# Patient Record
Sex: FEMALE | Race: WHITE | Employment: FULL TIME | URBAN - METROPOLITAN AREA
[De-identification: names, ages, dates, MRNs, and addresses within clinical notes are randomized per-mention and may not be internally consistent; named-entity substitution may affect disease eponyms.]

---

## 2017-03-06 ENCOUNTER — OFFICE VISIT (OUTPATIENT)
Dept: FAMILY MEDICINE | Facility: CLINIC | Age: 32
End: 2017-03-06

## 2017-03-06 VITALS
OXYGEN SATURATION: 99 % | BODY MASS INDEX: 31.84 KG/M2 | HEART RATE: 82 BPM | HEIGHT: 70 IN | WEIGHT: 222.4 LBS | TEMPERATURE: 98.8 F | DIASTOLIC BLOOD PRESSURE: 70 MMHG | SYSTOLIC BLOOD PRESSURE: 110 MMHG

## 2017-03-06 DIAGNOSIS — Z30.015 ENCOUNTER FOR INITIAL PRESCRIPTION OF VAGINAL RING HORMONAL CONTRACEPTIVE: ICD-10-CM

## 2017-03-06 DIAGNOSIS — Z01.419 ENCOUNTER FOR GYNECOLOGICAL EXAMINATION WITHOUT ABNORMAL FINDING: Primary | ICD-10-CM

## 2017-03-06 DIAGNOSIS — Z13.9 SCREENING FOR CONDITION: ICD-10-CM

## 2017-03-06 DIAGNOSIS — K64.4 ANAL SKIN TAG: ICD-10-CM

## 2017-03-06 PROCEDURE — 99395 PREV VISIT EST AGE 18-39: CPT | Performed by: FAMILY MEDICINE

## 2017-03-06 RX ORDER — LEVONORGESTREL/ETHIN.ESTRADIOL 0.1-0.02MG
1 TABLET ORAL DAILY
Qty: 84 TABLET | Refills: 3 | Status: CANCELLED | OUTPATIENT
Start: 2017-03-06

## 2017-03-06 RX ORDER — ETONOGESTREL AND ETHINYL ESTRADIOL VAGINAL RING .015; .12 MG/D; MG/D
RING VAGINAL
Qty: 3 EACH | Refills: 3 | Status: SHIPPED | OUTPATIENT
Start: 2017-03-06 | End: 2018-04-04

## 2017-03-06 NOTE — NURSING NOTE
Mireya is here for a non-fasting physical and pap.     Pre-Visit Screening :  Immunizations : up to date  Colonoscopy : na  Mammogram : na  Asthma Action Test/Plan : na  PHQ9/GAD7 :  Na    Pulse - regular  My Chart - accepts    CLASSIFICATION OF OVERWEIGHT AND OBESITY BY BMI                         Obesity Class           BMI(kg/m2)  Underweight                                    < 18.5  Normal                                         18.5-24.9  Overweight                                     25.0-29.9  OBESITY                     I                  30.0-34.9                              II                 35.0-39.9  EXTREME OBESITY             III                >40                             Patient's  BMI Body mass index is 32.37 kg/(m^2).  http://hin.nhlbi.nih.gov/menuplanner/menu.cgi  Questioned patient about current smoking habits.  Pt. has never smoked.  Tanya.JENNIFER العلي ([facility])

## 2017-03-06 NOTE — PROGRESS NOTES
31 year old female presents for her annual exam   Current contraception: oral contraceptives / no apparent side effects/ normal monthly withdrawal periods  History of abnormal Pap smear: yes - LSIL 2010/ colposcopy  HPV pos 2012  Normal pap smear 1/2015- defers until next year  Regular self breast exam: No   History of abnormal mammogram: no   Family history of breast, uterine or ovarian cancer: no   History of abnormal lipids: normal lipid 2016  In a monogamous relationship  defers STD screening    Patient Active Problem List   Diagnosis     Other abnormal Papanicolaou smear of cervix and cervical HPV(795.09)     ACP (advance care planning)     Health Care Home     Advanced directives, counseling/discussion     Past Surgical History   Procedure Laterality Date     No history of surgery       Family History   Problem Relation Age of Onset     Hypertension Maternal Grandmother      Breast Cancer No family hx of      Cancer - colorectal No family hx of      CEREBROVASCULAR DISEASE Paternal Grandfather      in 50's   \  Current Outpatient Prescriptions   Medication     levonorgestrel-ethinyl estradiol (AVIANE) 0.1-20 MG-MCG per tablet     Cholecalciferol (VITAMIN D3 PO)     No current facility-administered medications for this visit.      Review Of Systems  Skin: negative  Eyes: negative  Ears/Nose/Throat: negative  Respiratory: No shortness of breath, dyspnea on exertion, cough, or hemoptysis  Cardiovascular: negative  Gastrointestinal: has an anal tag that she wishes removed  Genitourinary: negative  Musculoskeletal: negative  Neurologic: negative  Psychiatric: negative  Hematologic/she would like to start beekeeping and wants to know if she is allergic to bees: referred to allergist for consultation   negative  Endocrine: obesity: weight unchanged- inactive/ planning on starting an exercise program  Gardens- eats fruits and vegetables    OBJECTIVE:  /70 (BP Location: Right arm, Patient Position: Chair, Cuff  "Size: Adult Large)  Pulse 82  Temp 98.8  F (37.1  C) (Oral)  Ht 1.765 m (5' 9.5\")  Wt 100.9 kg (222 lb 6.4 oz)  LMP 02/13/2017  SpO2 99%  BMI 32.37 kg/m2  General appearance: Healthy    Skin: Normal. No atypical appearing moles on inspection of trunk and extremities.    External ears  and canals clear bilaterally. TM's normal bilaterally. Nose normal without lesions or discharge. Oropharynx normal. Neck supple without palpable adenopathy.    Breasts are symmetric.  No dominant, discrete, fixed  or suspicious masses are noted.  No skin or nipple changes or axillary nodes.      Regular rate and  rhythm. S1 and S2 normal, no murmurs, clicks, gallops or rubs. No edema or JVD. Chest is clear; no wheezes or rales.    The abdomen is soft without tenderness, guarding, mass or organomegaly. Bowel sounds are normal. No CVA tenderness or inguinal adenopathy noted.    Pelvic:  Vagina and vulva are normal.   No palpable uterine or adnexal masses or tenderness.  Pap obtained and pending.    Rectal exam: large anal tag that she wishes removed- sometimes gets irritated    Extremities: negative.    Assessment    (Z01.419) Encounter for gynecological examination without abnormal finding  (primary encounter diagnosis)  Comment:  Plan:     (Z30.015) Encounter for initial prescription of vaginal ring hormonal contraceptive  Comment:five ring samples given  Plan: etonogestrel-ethinyl estradiol (NUVARING)         0.12-0.015 MG/24HR vaginal ring            (Z13.9) Screening for condition  Comment: bee allergy  Plan: ALLERGY/ASTHMA ADULT REFERRAL            (K64.4) Anal skin tag  Comment:   Plan: COLORECTAL SURGERY REFERRAL                   "

## 2017-03-06 NOTE — MR AVS SNAPSHOT
After Visit Summary   3/6/2017    Mireya Ardon    MRN: [id number]           Patient Information     Date Of Birth          1985        Visit Information        Provider Department      3/6/2017 3:30 PM Matilde Gregory MD [facility]        Today's Diagnoses     Encounter for gynecological examination without abnormal finding    -  1    Encounter for initial prescription of vaginal ring hormonal contraceptive          Care Instructions      When switching from a combination contraceptive, the vaginal ring may be inserted at any time but no later than the day that a new cycle of pills or patch would have been started; no alternative contraception is needed       Inserted 1 ring into vagina by patient and wear continuously for 3 weeks; remove for 1 week; repeat with new ring (regardless if withdrawal bleeding has or has not finished          Follow-ups after your visit        Who to contact     If you have questions or need follow up information about today's clinic visit or your schedule please contact [facility] directly at [phone].  Normal or non-critical lab and imaging results will be communicated to you by Kinoptohart, letter or phone within 4 business days after the clinic has received the results. If you do not hear from us within 7 days, please contact the clinic through SMITH (formerly Ascentium)t or phone. If you have a critical or abnormal lab result, we will notify you by phone as soon as possible.  Submit refill requests through Cyanto or call your pharmacy and they will forward the refill request to us. Please allow 3 business days for your refill to be completed.          Additional Information About Your Visit        MyChart Information     Cyanto gives you secure access to your electronic health record. If you see a primary care provider, you can also send messages to your care team and make appointments. If you have questions, please call your  "primary care clinic.  If you do not have a primary care provider, please call [phone] and they will assist you.        Care EveryWhere ID     This is your Care EveryWhere ID. This could be used by other organizations to access your [facility] medical records  [id number]        Your Vitals Were     Pulse Temperature Height Last Period Pulse Oximetry BMI (Body Mass Index)    82 98.8  F (37.1  C) (Oral) 1.765 m (5' 9.5\") 02/13/2017 99% 32.37 kg/m2       Blood Pressure from Last 3 Encounters:   03/06/17 110/70   02/15/16 110/72   01/07/15 110/70    Weight from Last 3 Encounters:   03/06/17 100.9 kg (222 lb 6.4 oz)   02/15/16 101.2 kg (223 lb)   01/07/15 100.5 kg (221 lb 9.6 oz)              Today, you had the following     No orders found for display         Today's Medication Changes          These changes are accurate as of: 3/6/17  4:19 PM.  If you have any questions, ask your nurse or doctor.               Start taking these medicines.        Dose/Directions    etonogestrel-ethinyl estradiol 0.12-0.015 MG/24HR vaginal ring   Commonly known as:  NUVARING   Used for:  Encounter for initial prescription of vaginal ring hormonal contraceptive   Started by:  Matilde Gregory MD        Insert 1 ring vaginally every 21 days then remove for 1 week then repeat with new ring.   Quantity:  3 each   Refills:  3            Where to get your medicines      These medications were sent to [facility]/pharmacy #[id number] - [place], [place] - [address]  [address] [facility] [address]     Phone:  [phone]     etonogestrel-ethinyl estradiol 0.12-0.015 MG/24HR vaginal ring                Primary Care Provider Office Phone # Fax #    Matilde Gregory [phone]       [facility] [address] [facility] [address]  [facility] [address]        Thank you!     Thank you for choosing [facility]  for your care. Our goal is always to provide you with excellent care. Hearing back " from our patients is one way we can continue to improve our services. Please take a few minutes to complete the written survey that you may receive in the mail after your visit with us. Thank you!             Your Updated Medication List - Protect others around you: Learn how to safely use, store and throw away your medicines at www.disposemymeds.org.          This list is accurate as of: 3/6/17  4:19 PM.  Always use your most recent med list.                   Brand Name Dispense Instructions for use    etonogestrel-ethinyl estradiol 0.12-0.015 MG/24HR vaginal ring    NUVARING    3 each    Insert 1 ring vaginally every 21 days then remove for 1 week then repeat with new ring.       levonorgestrel-ethinyl estradiol 0.1-20 MG-MCG per tablet    AVIANE    84 tablet    Take 1 tablet by mouth daily       VITAMIN D3 PO      Take 5,000 Units by mouth daily

## 2017-03-06 NOTE — PATIENT INSTRUCTIONS
When switching from a combination contraceptive, the vaginal ring may be inserted at any time but no later than the day that a new cycle of pills or patch would have been started; no alternative contraception is needed       Inserted 1 ring into vagina by patient and wear continuously for 3 weeks; remove for 1 week; repeat with new ring (regardless if withdrawal bleeding has or has not finished

## 2017-04-13 ENCOUNTER — TRANSFERRED RECORDS (OUTPATIENT)
Dept: FAMILY MEDICINE | Facility: CLINIC | Age: 32
End: 2017-04-13

## 2017-04-21 ENCOUNTER — MYC MEDICAL ADVICE (OUTPATIENT)
Dept: FAMILY MEDICINE | Facility: CLINIC | Age: 32
End: 2017-04-21

## 2017-04-21 NOTE — TELEPHONE ENCOUNTER
Mireya Ardon called the clinic support line with the following:    States that she went to the Allergist and they scheduled her on the wrong day for venom testing. They also told her since she has never been stung, that they cannot test her. If she were to get stung and react to call 911. She is going to call another Allergist to see if they say the same thing to her.    She can call us back if she needs a referral for this.   Can close once noted

## 2017-04-24 NOTE — TELEPHONE ENCOUNTER
[employer] also states that bee venom testing is NOT indicated without a history of an allergic reaction to bee sting.    Incidence of systemic bee sting reactions is 3%    No testing available to date to determine ahead of time if you could be allergic    Please call

## 2017-05-08 ENCOUNTER — OFFICE VISIT (OUTPATIENT)
Dept: FAMILY MEDICINE | Facility: CLINIC | Age: 32
End: 2017-05-08

## 2017-05-08 VITALS
BODY MASS INDEX: 33.3 KG/M2 | WEIGHT: 224.8 LBS | HEIGHT: 69 IN | SYSTOLIC BLOOD PRESSURE: 104 MMHG | DIASTOLIC BLOOD PRESSURE: 70 MMHG | TEMPERATURE: 98.7 F | OXYGEN SATURATION: 97 % | HEART RATE: 83 BPM

## 2017-05-08 DIAGNOSIS — D69.6 TEMPORARY LOW PLATELET COUNT (H): ICD-10-CM

## 2017-05-08 DIAGNOSIS — Z13.9 SCREENING FOR CONDITION: ICD-10-CM

## 2017-05-08 DIAGNOSIS — Z01.818 PREOP GENERAL PHYSICAL EXAM: Primary | ICD-10-CM

## 2017-05-08 DIAGNOSIS — K64.4 SKIN TAG OF ANUS: ICD-10-CM

## 2017-05-08 LAB
ERYTHROCYTE [DISTWIDTH] IN BLOOD BY AUTOMATED COUNT: 12.5 %
HCT VFR BLD AUTO: 42.8 % (ref 35–47)
HEMOGLOBIN: 13.8 G/DL (ref 11.7–15.7)
MCH RBC QN AUTO: 29.3 PG (ref 26–33)
MCHC RBC AUTO-ENTMCNC: 32.2 G/DL (ref 31–36)
MCV RBC AUTO: 90.8 FL (ref 78–100)
PLATELET COUNT - QUEST: 109 10^9/L (ref 150–375)
RBC # BLD AUTO: 4.71 10*12/L (ref 3.8–5.2)
WBC # BLD AUTO: 10.2 10*9/L (ref 4–11)

## 2017-05-08 PROCEDURE — 85027 COMPLETE CBC AUTOMATED: CPT | Performed by: FAMILY MEDICINE

## 2017-05-08 PROCEDURE — 36415 COLL VENOUS BLD VENIPUNCTURE: CPT | Performed by: FAMILY MEDICINE

## 2017-05-08 PROCEDURE — 99214 OFFICE O/P EST MOD 30 MIN: CPT | Performed by: FAMILY MEDICINE

## 2017-05-08 NOTE — PATIENT INSTRUCTIONS
Recheck blood count in three months  This can be a lab only appoiintment  Schedule fasting (nothing to eat or drink for 9 hours except water)

## 2017-05-08 NOTE — MR AVS SNAPSHOT
After Visit Summary   5/8/2017    Mireya Ardon    MRN: [id number]           Patient Information     Date Of Birth          1985        Visit Information        Provider Department      5/8/2017 4:30 PM Matilde Gregory MD [facility]        Today's Diagnoses     Preop general physical exam    -  1    Skin tag of anus        Temporary low platelet count (H)        Screening for condition          Care Instructions    Recheck blood count in three months  This can be a lab only appoiintment  Schedule fasting (nothing to eat or drink for 9 hours except water)        Follow-ups after your visit        Future tests that were ordered for you today     Open Standing Orders        Priority Remaining Interval Expires Ordered    Lipid Profile Routine 1/1 9/8/2017 5/8/2017    Glucose Fasting (BFP) Routine 1/1 9/8/2017 5/8/2017    VENOUS COLLECTION Routine 1/1 9/8/2017 5/8/2017            Who to contact     If you have questions or need follow up information about today's clinic visit or your schedule please contact [facility] directly at [phone].  Normal or non-critical lab and imaging results will be communicated to you by gloStreamhart, letter or phone within 4 business days after the clinic has received the results. If you do not hear from us within 7 days, please contact the clinic through Dattot or phone. If you have a critical or abnormal lab result, we will notify you by phone as soon as possible.  Submit refill requests through Xola or call your pharmacy and they will forward the refill request to us. Please allow 3 business days for your refill to be completed.          Additional Information About Your Visit        gloStreamhart Information     Xola gives you secure access to your electronic health record. If you see a primary care provider, you can also send messages to your care team and make appointments. If you have questions, please call  "your primary care clinic.  If you do not have a primary care provider, please call [phone] and they will assist you.        Care EveryWhere ID     This is your Care EveryWhere ID. This could be used by other organizations to access your [facility] medical records  [id number]        Your Vitals Were     Pulse Temperature Height Last Period Pulse Oximetry Breastfeeding?    83 98.7  F (37.1  C) (Oral) 1.753 m (5' 9\") 04/11/2017 97% No    BMI (Body Mass Index)                   33.2 kg/m2            Blood Pressure from Last 3 Encounters:   05/08/17 104/70   03/06/17 110/70   02/15/16 110/72    Weight from Last 3 Encounters:   05/08/17 102 kg (224 lb 12.8 oz)   03/06/17 100.9 kg (222 lb 6.4 oz)   02/15/16 101.2 kg (223 lb)              We Performed the Following     HEMOGRAM/PLATELET (BFP)     VENOUS COLLECTION        Primary Care Provider Office Phone # Fax #    Matilde Gregory [phone]       [facility] [address]   [facility] [address]        Thank you!     Thank you for choosing [facility]  for your care. Our goal is always to provide you with excellent care. Hearing back from our patients is one way we can continue to improve our services. Please take a few minutes to complete the written survey that you may receive in the mail after your visit with us. Thank you!             Your Updated Medication List - Protect others around you: Learn how to safely use, store and throw away your medicines at www.disposemymeds.org.          This list is accurate as of: 5/8/17 11:59 PM.  Always use your most recent med list.                   Brand Name Dispense Instructions for use    etonogestrel-ethinyl estradiol 0.12-0.015 MG/24HR vaginal ring    NUVARING    3 each    Insert 1 ring vaginally every 21 days then remove for 1 week then repeat with new ring.       VITAMIN D3 PO      Take 5,000 Units by mouth daily         "

## 2017-05-08 NOTE — PROGRESS NOTES
[facility]  [address]  [address]  [facility] [address] [phone]  Dept: [phone]    PRE-OP EVALUATION:  Today's date: 2017    Mireya Ardon (: 1985) presents for pre-operative evaluation assessment as requested by Dr. Friedman.  She requires evaluation and anesthesia risk assessment prior to undergoing surgery/procedure for treatment of EUA, Excision of Skin Tag .  Proposed procedure:  Anal Skin Tag    Date of Surgery/ Procedure: 17  Time of Surgery/ Procedure: [facility]/Surgical Facility: [facility]  Fax number for surgical facility: [phone]  Primary Physician: Matilde Gregory  Type of Anesthesia Anticipated: to be determined    Patient has a Health Care Directive or Living Will:  NO    1. NO - Do you have a history of heart attack, stroke, stent, bypass or surgery on an artery in the head, neck, heart or legs?  2. NO - Do you ever have any pain or discomfort in your chest?  3. NO - Do you have a history of  Heart Failure?  4. NO - Are you troubled by shortness of breath when: walking on the level, up a slight hill or at night?  5. NO - Do you currently have a cold, bronchitis or other respiratory infection?  6. NO - Do you have a cough, shortness of breath or wheezing?  7. NO - Do you sometimes get pains in the calves of your legs when you walk?  8. NO - Do you or anyone in your family have previous history of blood clots?  9. NO - Do you or does anyone in your family have a serious bleeding problem such as prolonged bleeding following surgeries or cuts?  10. NO - Have you ever had problems with anemia or been told to take iron pills?  11. NO - Have you had any abnormal blood loss such as black, tarry or bloody stools, or abnormal vaginal bleeding?  12. NO - Have you ever had a blood transfusion?  13. NO - Have you or any of your relatives ever had problems with anesthesia?  14. NO- Do you have sleep apnea, excessive  snoring or daytime drowsiness?  Patient admits to mild snoring  15. NO - Do you have any prosthetic heart valves?  16. NO - Do you have prosthetic joints?  17. NO - Is there any chance that you may be pregnant?      HPI:                                                      Brief HPI related to upcoming procedure: bothersome anal tag    MEDICAL HISTORY:                                                      Patient Active Problem List    Diagnosis Date Noted     Advanced directives, counseling/discussion 01/07/2015     Priority: Medium     Advance Care Planning:   ACP Review and Resources Provided:  Reviewed chart for advance care plan.  Mireya Ardon has no plan or code status on file. Discussed available resources and provided with information. Confirmed code status reflects current choices pending further ACP discussions.  Confirmed/documented designated decision maker(s). See permanent comments section of demographics in clinical tab.   Added by Magdalena Loyd on 1/7/2015             Health [facility] [place] 09/23/2013     Priority: Medium     State Tier Level:  Tier 0  Status:  n/a  Care Coordinator:   See Letters for [facility] Care Plan           ACP (advance care planning) 05/01/2012     Priority: Medium       Advance Care Planning 2/15/2016: ACP Review of Chart / Resources Provided:  Reviewed chart for advance care plan.  Mireya Ardon has no plan or code status on file. Discussed available resources and provided with information. Confirmed code status reflects current choices pending further ACP discussions.  Confirmed/documented legally designated decision makers.  Added by Johanna Hilario             Other abnormal Papanicolaou smear of cervix and cervical HPV(795.09) 03/22/2011     Priority: Medium      No past medical history on file.  Past Surgical History:   Procedure Laterality Date     NO HISTORY OF SURGERY       Current Outpatient Prescriptions   Medication Sig Dispense Refill     etonogestrel-ethinyl  "estradiol (NUVARING) 0.12-0.015 MG/24HR vaginal ring Insert 1 ring vaginally every 21 days then remove for 1 week then repeat with new ring. 3 each 3     Cholecalciferol (VITAMIN D3 PO) Take 5,000 Units by mouth daily       OTC products: None, except as noted above    No Known Allergies   Latex Allergy: NO    Social History   Substance Use Topics     Smoking status: Never Smoker     Smokeless tobacco: Never Used     Alcohol use 4.0 oz/week     8 drink(s) per week     History   Drug Use No       REVIEW OF SYSTEMS:                                                    C: NEGATIVE for fever, chills, change in weight  E/M: NEGATIVE for ear, mouth and throat problems  R: NEGATIVE for significant cough or SOB  CV: NEGATIVE for chest pain, palpitations or peripheral edema    EXAM:                                                    /70 (BP Location: Left arm, Patient Position: Chair, Cuff Size: Adult Large)  Pulse 83  Temp 98.7  F (37.1  C) (Oral)  Ht 1.753 m (5' 9\")  Wt 102 kg (224 lb 12.8 oz)  SpO2 97%  Breastfeeding? No  BMI 33.2 kg/m2  GENERAL APPEARANCE: healthy, alert and no distress  HENT: ear canals and TM's normal and nose and mouth without ulcers or lesions  RESP: lungs clear to auscultation - no rales, rhonchi or wheezes  CV: regular rate and rhythm, normal S1 S2, no S3 or S4 and no murmur, click or rub   ABDOMEN: soft, nontender, no HSM or masses and bowel sounds normal  NEURO: Normal strength and tone,  mentation intact and speech normal    DIAGNOSTICS:                                                    EKG: Not indicated due to non-vascular surgery and low risk of event (age <65 and without cardiac risk factors)    Hemoglobin   Date Value Ref Range Status   05/08/2017 13.8 11.7 - 15.7 g/dL Final   ]  Low Platelet count 109- no complaints of bleeding-  Recheck in three months (recent viral infection)    Recent Labs   Lab Test  11/14/13   1727  05/01/12   1618   HGB  14.4  13.9        IMPRESSION:       "                                              Reason for surgery/procedure: problematic anal skin tag    The proposed surgical procedure is considered LOW risk.    REVISED CARDIAC RISK INDEX  The patient has the following serious cardiovascular risks for perioperative complications such as (MI, PE, VFib and 3  AV Block):  No serious cardiac risks  INTERPRETATION: 0 risks: Class I (very low risk - 0.4% complication rate)    The patient has the following additional risks for perioperative complications:  No identified additional risks      ICD-10-CM    1. Preop general physical exam Z01.818 HEMOGRAM/PLATELET (BFP)     VENOUS COLLECTION   2. Skin tag of anus K64.4 HEMOGRAM/PLATELET (BFP)     VENOUS COLLECTION   3. Temporary low platelet count (H) D69.6    4. Screening for condition Z13.9 Lipid Profile     Glucose Fasting (BFP)     VENOUS COLLECTION       RECOMMENDATIONS:                                                    APPROVAL GIVEN to proceed with proposed procedure, without further diagnostic evaluation       Signed Electronically by: Matilde Gregory MD    Copy of this evaluation report is provided to requesting physician.    [facility] Preop Guidelines

## 2017-05-12 ENCOUNTER — HOSPITAL PATHOLOGY (OUTPATIENT)
Dept: OTHER | Facility: CLINIC | Age: 32
End: 2017-05-12

## 2017-05-15 LAB — COPATH REPORT: NORMAL

## 2017-09-06 DIAGNOSIS — Z13.9 SCREENING FOR CONDITION: ICD-10-CM

## 2017-09-06 LAB — GLUCOSE SERPL-MCNC: 77 MG/DL (ref 60–99)

## 2017-09-06 PROCEDURE — 82947 ASSAY GLUCOSE BLOOD QUANT: CPT | Performed by: FAMILY MEDICINE

## 2017-09-06 PROCEDURE — 80061 LIPID PANEL: CPT | Mod: 90 | Performed by: FAMILY MEDICINE

## 2017-09-06 PROCEDURE — 36415 COLL VENOUS BLD VENIPUNCTURE: CPT | Performed by: FAMILY MEDICINE

## 2017-09-08 LAB
CHOLEST SERPL-MCNC: 249 MG/DL
CHOLEST/HDLC SERPL: 3.3 (CALC)
HDLC SERPL-MCNC: 75 MG/DL
LDLC SERPL CALC-MCNC: 150 MG/DL (CALC)
NONHDLC SERPL-MCNC: 174 MG/DL (CALC)
TRIGL SERPL-MCNC: 120 MG/DL

## 2017-09-11 ENCOUNTER — TELEPHONE (OUTPATIENT)
Dept: FAMILY MEDICINE | Facility: CLINIC | Age: 32
End: 2017-09-11

## 2017-09-11 NOTE — TELEPHONE ENCOUNTER
Pt left some Health screening Forms for biometric screening.     They have been filled, faxed and a copy has been made for our records for scanning.    The original is being send back to Pt for there records.     JENNIFER Pascual ([facility])

## 2018-03-10 ENCOUNTER — HEALTH MAINTENANCE LETTER (OUTPATIENT)
Age: 33
End: 2018-03-10

## 2018-04-04 ENCOUNTER — TELEPHONE (OUTPATIENT)
Dept: FAMILY MEDICINE | Facility: CLINIC | Age: 33
End: 2018-04-04

## 2018-04-04 DIAGNOSIS — Z30.015 ENCOUNTER FOR INITIAL PRESCRIPTION OF VAGINAL RING HORMONAL CONTRACEPTIVE: ICD-10-CM

## 2018-04-04 RX ORDER — ETONOGESTREL AND ETHINYL ESTRADIOL VAGINAL RING .015; .12 MG/D; MG/D
RING VAGINAL
Qty: 1 EACH | Refills: 0 | COMMUNITY
Start: 2018-04-04 | End: 2018-04-23

## 2018-04-04 NOTE — TELEPHONE ENCOUNTER
Ok refill of birth control for one month only called into [facility]. Pt needs non fasting CPX  for further refills.     Thanks,Xiao    [phone] (home)

## 2018-04-23 ENCOUNTER — OFFICE VISIT (OUTPATIENT)
Dept: FAMILY MEDICINE | Facility: CLINIC | Age: 33
End: 2018-04-23

## 2018-04-23 VITALS
TEMPERATURE: 98.9 F | OXYGEN SATURATION: 97 % | BODY MASS INDEX: 32.64 KG/M2 | SYSTOLIC BLOOD PRESSURE: 116 MMHG | DIASTOLIC BLOOD PRESSURE: 72 MMHG | HEIGHT: 70 IN | WEIGHT: 228 LBS | HEART RATE: 79 BPM

## 2018-04-23 DIAGNOSIS — Z01.419 ENCOUNTER FOR GYNECOLOGICAL EXAMINATION WITHOUT ABNORMAL FINDING: Primary | ICD-10-CM

## 2018-04-23 DIAGNOSIS — Z30.015 ENCOUNTER FOR INITIAL PRESCRIPTION OF VAGINAL RING HORMONAL CONTRACEPTIVE: ICD-10-CM

## 2018-04-23 LAB
ERYTHROCYTE [DISTWIDTH] IN BLOOD BY AUTOMATED COUNT: 13.7 %
HCT VFR BLD AUTO: 43.1 % (ref 35–47)
HEMOGLOBIN: 13.8 G/DL (ref 11.7–15.7)
MCH RBC QN AUTO: 28.6 PG (ref 26–33)
MCHC RBC AUTO-ENTMCNC: 32 G/DL (ref 31–36)
MCV RBC AUTO: 89.5 FL (ref 78–100)
PLATELET COUNT - QUEST: 193 10^9/L (ref 150–375)
RBC # BLD AUTO: 4.82 10*12/L (ref 3.8–5.2)
WBC # BLD AUTO: 11.6 10*9/L (ref 4–11)

## 2018-04-23 PROCEDURE — 87591 N.GONORRHOEAE DNA AMP PROB: CPT | Mod: 90 | Performed by: FAMILY MEDICINE

## 2018-04-23 PROCEDURE — 36415 COLL VENOUS BLD VENIPUNCTURE: CPT | Performed by: FAMILY MEDICINE

## 2018-04-23 PROCEDURE — 99395 PREV VISIT EST AGE 18-39: CPT | Performed by: FAMILY MEDICINE

## 2018-04-23 PROCEDURE — 85027 COMPLETE CBC AUTOMATED: CPT | Performed by: FAMILY MEDICINE

## 2018-04-23 PROCEDURE — 87491 CHLMYD TRACH DNA AMP PROBE: CPT | Mod: 90 | Performed by: FAMILY MEDICINE

## 2018-04-23 PROCEDURE — 88142 CYTOPATH C/V THIN LAYER: CPT | Mod: 90 | Performed by: FAMILY MEDICINE

## 2018-04-23 RX ORDER — ETONOGESTREL AND ETHINYL ESTRADIOL VAGINAL RING .015; .12 MG/D; MG/D
RING VAGINAL
Qty: 3 EACH | Refills: 3 | Status: SHIPPED | OUTPATIENT
Start: 2018-04-23 | End: 2019-05-04

## 2018-04-23 NOTE — MR AVS SNAPSHOT
After Visit Summary   4/23/2018    Mireya Ardon    MRN: [id number]           Patient Information     Date Of Birth          1985        Visit Information        Provider Department      4/23/2018 4:30 PM Matilde Gregory MD [facility]        Today's Diagnoses     Encounter for gynecological examination without abnormal finding    -  1    Encounter for initial prescription of vaginal ring hormonal contraceptive           Follow-ups after your visit        Future tests that were ordered for you today     Open Standing Orders        Priority Remaining Interval Expires Ordered    Glucose Fasting (BFP) Routine 1/1 6/23/2018 4/23/2018    Lipid Profile Routine 1/1 6/23/2018 4/23/2018            Who to contact     If you have questions or need follow up information about today's clinic visit or your schedule please contact [facility] directly at [phone].  Normal or non-critical lab and imaging results will be communicated to you by MyChart, letter or phone within 4 business days after the clinic has received the results. If you do not hear from us within 7 days, please contact the clinic through [employer] or phone. If you have a critical or abnormal lab result, we will notify you by phone as soon as possible.  Submit refill requests through [employer] or call your pharmacy and they will forward the refill request to us. Please allow 3 business days for your refill to be completed.          Additional Information About Your Visit        MyChart Information     [employer] gives you secure access to your electronic health record. If you see a primary care provider, you can also send messages to your care team and make appointments. If you have questions, please call your primary care clinic.  If you do not have a primary care provider, please call [phone] and they will assist you.        Care EveryWhere ID     This is your Care EveryWhere ID. This  "could be used by other organizations to access your [place] medical records  [id number]        Your Vitals Were     Pulse Temperature Height Last Period Pulse Oximetry BMI (Body Mass Index)    79 98.9  F (37.2  C) (Oral) 1.778 m (5' 10\") 04/03/2018 (Exact Date) 97% 32.71 kg/m2       Blood Pressure from Last 3 Encounters:   04/23/18 116/72   05/08/17 104/70   03/06/17 110/70    Weight from Last 3 Encounters:   04/23/18 103.4 kg (228 lb)   05/08/17 102 kg (224 lb 12.8 oz)   03/06/17 100.9 kg (222 lb 6.4 oz)              We Performed the Following     Chlamydia Trach/N. Gonorrhoeae RNA TMA(Pap, Swab,Urine)([facility])     HEMOGRAM/PLATELET (BFP)          Where to get your medicines      These medications were sent to [facility]/pharmacy #[id number] - [facility] [address] [address] [facility]  [address] [address] [facility] [address]     Phone:  [phone]     etonogestrel-ethinyl estradiol 0.12-0.015 MG/24HR vaginal ring          Primary Care Provider Office Phone # Fax #    Matilde Gregory [phone] [address] [address]        Equal Access to Services     TIAN STEELE : Hadii cher baker Sobrad, waaxda ludilma, qaybta kaalmadebbie randolph, maciel gipson. So [facility] [phone].    ATENCIÓN: Si habla español, tiene a velez disposición servicios gratuitos de asistencia lingüística. Boby al [phone].    We comply with applicable federal civil rights laws and [place] laws. We do not discriminate on the basis of race, color, national origin, age, disability, sex, sexual orientation, or gender identity.            Thank you!     Thank you for choosing [facility]  for your care. Our goal is always to provide you with excellent care. Hearing back from our patients is one way we can continue to improve our services. Please take a few minutes to complete the written survey that you may receive in the mail after your visit with us. " Thank you!             Your Updated Medication List - Protect others around you: Learn how to safely use, store and throw away your medicines at www.disposemymeds.org.          This list is accurate as of 4/23/18  5:21 PM.  Always use your most recent med list.                   Brand Name Dispense Instructions for use Diagnosis    etonogestrel-ethinyl estradiol 0.12-0.015 MG/24HR vaginal ring    NUVARING    3 each    Insert 1 ring vaginally every 21 days then remove for 1 week then repeat with new ring.    Encounter for initial prescription of vaginal ring hormonal contraceptive       VITAMIN D3 PO      Take 5,000 Units by mouth daily

## 2018-04-23 NOTE — NURSING NOTE
Mireya is here for annual physical and is not fasting. She is due for a PAP and will have that done today.    Patient is here for a full physical exam.    Pre-Visit Screening :  Immunizations : up to date  Colon Screening : NA  Mammogram: NA  Asthma Action Test/Plan : No concerns   PHQ9 :  PHQ-2 done today   GAD7 :  No concerns  Patient's  BMI Body mass index is 32.71 kg/(m^2).  Questioned patient about current smoking habits.  Pt. has never smoked.  OK to leave a detailed voice message regarding today's visit yes, phone # [phone]  (cell)        ETOH screening:  Questions:  1-How often do you have a drink containing alcohol?                             1 times per month(s)  2-How many drinks containing alcohol do you have on a typical day when you are         Drinking?                              1-2  3- How often do you have 5 or more drinks on one occasion?                              Never for most part-maybe once a year

## 2018-04-23 NOTE — PROGRESS NOTES
Chief Complaint: Mireya Ardon is an 32 year old woman who presents for preventive health visit.      Besides routine health maintenance, she has no other health concerns today .     Refill of BCP   Due for pap smear  STD screening: chamydia (monogamous)  High lipids:   Normal hemoglobin one year ago (recheck, low platelet count)      Healthy Habits:  Do you get at least three servings of calcium containing foods daily (dairy, green leafy vegetables, etc.)? yes  Outside of work or daily activities, how many days per week do you exercise for 30 minutes or longer? Encouraged daily exercise  Have you had an eye exam in the past two years? Yes- wears contacts  Do you see a dentist twice per year? yes    PHQ-2  Over the last two weeks- Have you been bothered by little interest or pleasure in doing things?  No  Over the last two weeks- Have you been feeling down, depressed, or hopeless?  No       Social History   Substance Use Topics     Smoking status: Never Smoker     Smokeless tobacco: Never Used     Alcohol use 4.0 oz/week     8 drink(s) per week       Reviewed orders with patient.  Reviewed health maintenance and updated orders accordingly - Yes  Prenatal counseling discussion- folic acid supplement- (taking)      History of abnormal Pap smear: NO - age 30- 65 PAP every 3 years recommended  All Histories reviewed and updated in [facility].      ROS:  CONSTITUTIONAL: NEGATIVE for fever, chills, change in weight  INTEGUMENTARU/SKIN: NEGATIVE for worrisome rashes, moles or lesions  EYES: NEGATIVE for vision changes or irritation  ENT: NEGATIVE for ear, mouth and throat problems  RESP: NEGATIVE for significant cough or SOB  BREAST: NEGATIVE for masses, tenderness or discharge  CV: NEGATIVE for chest pain, palpitations or peripheral edema  GI: NEGATIVE for nausea, abdominal pain, heartburn, or change in bowel habits  : NEGATIVE for unusual urinary or vaginal symptoms. Periods are regular.  MUSCULOSKELETAL: NEGATIVE for  "significant arthralgias or myalgia  NEURO: NEGATIVE for weakness, dizziness or paresthesias  PSYCHIATRIC: NEGATIVE for changes in mood or affect      OBJECTIVE:  /72 (BP Location: Left arm, Patient Position: Sitting, Cuff Size: Adult Large)  Pulse 79  Temp 98.9  F (37.2  C) (Oral)  Ht 1.778 m (5' 10\")  Wt 103.4 kg (228 lb)  LMP 04/03/2018 (Exact Date)  SpO2 97%  BMI 32.71 kg/m2  General appearance: Healthy    Skin: Normal. No atypical appearing moles on inspection of trunk and extremities.    External ears  and canals clear bilaterally. TM's normal bilaterally. Nose normal without lesions or discharge. Oropharynx normal. Neck supple without palpable adenopathy.    Breasts are symmetric.  No dominant, discrete, fixed  or suspicious masses are noted.  No skin or nipple changes or axillary nodes.     Regular rate and  rhythm. S1 and S2 normal, no murmurs, clicks, gallops or rubs. No edema or JVD. Chest is clear; no wheezes or rales.    The abdomen is soft without tenderness, guarding, mass or organomegaly. Bowel sounds are normal. No CVA tenderness or inguinal adenopathy noted.    Pelvic:  Vagina and vulva are normal.   No palpable uterine or adnexal masses or tenderness.  Pap obtained and pending.    Rectal exam: deferred    Extremities: negative.      COUNSELING:  Reviewed preventive health counseling, as reflected in patient instructions       Regular exercise       Healthy diet/nutrition       Contraception    ATP III Guidelines  ICSI Preventive Guidelines    ASSESSMENT/PLAN:  (Z01.419) Encounter for gynecological examination without abnormal finding  (primary encounter diagnosis)  Comment: required labs for employers health benefit  Plan: Chlamydia Trach/N. Gonorrhoeae RNA TMA(Pap,         Swab,Urine)([facility]), HEMOGRAM/PLATELET (BFP),         Glucose Fasting (BFP), Lipid Profile, ThinPrep         Pap and HPV (mRNA E6/E7) ([facility])            (Z30.015) Encounter for initial prescription of vaginal ring " hormonal contraceptive  Comment:   Plan: etonogestrel-ethinyl estradiol (NUVARING)         0.12-0.015 MG/24HR vaginal ring

## 2018-04-26 LAB
CHLAMYDIA TRACHOMATIS RNA, TMA - QUEST: NOT DETECTED
CLINICAL HISTORY - QUEST: NORMAL
COMMENT - QUEST: NORMAL
CYTOTECHNOLOGIST - QUEST: NORMAL
DESCRIPTIVE DIAGNOSIS - QUEST: NORMAL
LAST PAP DX - QUEST: NORMAL
LMP - QUEST: NORMAL
NEISSERIA GONORRHOEAE RNA TMA: NOT DETECTED
PREV BX DX - QUEST: NORMAL
SEE NOTE - QUEST: NORMAL
SOURCE: NORMAL
STATEMENT OF ADEQUACY - QUEST: NORMAL

## 2018-11-16 ENCOUNTER — MYC MEDICAL ADVICE (OUTPATIENT)
Dept: FAMILY MEDICINE | Facility: CLINIC | Age: 33
End: 2018-11-16

## 2018-11-16 DIAGNOSIS — A09 TRAVELER'S DIARRHEA: Primary | ICD-10-CM

## 2018-11-16 RX ORDER — CIPROFLOXACIN 500 MG/1
500 TABLET, FILM COATED ORAL 2 TIMES DAILY
Qty: 6 TABLET | Refills: 0 | Status: SHIPPED | OUTPATIENT
Start: 2018-11-16 | End: 2020-03-12

## 2018-11-16 NOTE — TELEPHONE ENCOUNTER
From: Mireya Ardon  To: Matilde Gregory MD  Sent: 11/16/2018 8:21 AM CST  Subject: Do I need an appointment?    I just got back from [place] long [place] from [place]. And I have had bad stomach cramps for 4 days now. The first day they were pretty constant throughout the day, they have gotten less frequent, but still hurt a lot when they happen. It's worse after I eat or drink somthing. All my bowel movements have been watery or not solid, but not frequent. I just got my period today, so I'm trying to figure out if I just forgot what menstrual cramps feel like (it feels more up in my stomach for it to be menstrualcramps though) or if I have something I should worry about and look into more. Thank you

## 2019-05-04 DIAGNOSIS — Z30.015 ENCOUNTER FOR INITIAL PRESCRIPTION OF VAGINAL RING HORMONAL CONTRACEPTIVE: ICD-10-CM

## 2019-05-04 RX ORDER — ETONOGESTREL AND ETHINYL ESTRADIOL VAGINAL RING .015; .12 MG/D; MG/D
RING VAGINAL
Qty: 1 EACH | Refills: 0 | COMMUNITY
Start: 2019-05-04 | End: 2019-05-10

## 2019-05-04 NOTE — TELEPHONE ENCOUNTER
Received incoming refill request for   Pending Prescriptions:                       Disp   Refills    etonogestrel-ethinyl estradiol (NUVARING)*       3            Sig: INSERT 1 RING VAGINALLY EVERY 21 DAYS THEN REMOVE           FOR 1 WEEK THEN REPEAT WITH NEW RING.    Patient last had a refill of this medication on 4/23/18 for a year supply. She was last seen in clinic for a physical on this day and is now due to be seen. 1 nuvaring was called into the pharmacy for the patient as an extension. Routing to  to call and schedule patient.

## 2019-05-10 ENCOUNTER — MYC MEDICAL ADVICE (OUTPATIENT)
Dept: FAMILY MEDICINE | Facility: CLINIC | Age: 34
End: 2019-05-10

## 2019-05-10 DIAGNOSIS — Z30.015 ENCOUNTER FOR INITIAL PRESCRIPTION OF VAGINAL RING HORMONAL CONTRACEPTIVE: ICD-10-CM

## 2019-05-10 RX ORDER — ETONOGESTREL AND ETHINYL ESTRADIOL VAGINAL RING .015; .12 MG/D; MG/D
RING VAGINAL
Qty: 3 EACH | Refills: 1 | Status: SHIPPED | OUTPATIENT
Start: 2019-05-10 | End: 2020-03-12

## 2019-05-10 NOTE — TELEPHONE ENCOUNTER
"From: Mireya Ardon  To: Matilde Gregory MD  Sent: 5/10/2019 7:52 AM CDT  Subject: Question about medications    Hello, so I tried to come in for a scheduled yearly physical appointment with you yesterday, but I was told at the  that you are no longer in my network for health insurance and I would have to pay out of pocket if I wanted to continue to see you. So I'm pretty bummed about that. I only need about 5 more months of birth control, then I'm getting  in Oct and we are going to try to get pregnant. I was going to ask about stopping BC before Oct, and if that would mess my body up with spotting or bleeding irregularly. Since I would kind of like to avoid stuff like that on my wedding day :) I'm also not really sure if I should try and find a new Dr. 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 Any advice would be welcome, if you are able to give it. Thank you.  "

## 2019-11-04 DIAGNOSIS — Z30.015 ENCOUNTER FOR INITIAL PRESCRIPTION OF VAGINAL RING HORMONAL CONTRACEPTIVE: ICD-10-CM

## 2019-11-04 RX ORDER — ETONOGESTREL AND ETHINYL ESTRADIOL VAGINAL RING .015; .12 MG/D; MG/D
RING VAGINAL
Refills: 1 | OUTPATIENT
Start: 2019-11-04

## 2019-11-04 NOTE — TELEPHONE ENCOUNTER
Refused Prescriptions:                       Disp   Refills    etonogestrel-ethinyl estradiol (NUVARING) *       1        Si-28 PER INS.INSERT 1 RING VAGINALLY EVERY 21 DAYS           THEN REMOVE FOR 1 WEEK THEN REPEAT WITH NEW RING.  Refused By: DELMY TOWNSEND  Reason for Refusal: Patient needs appointment    See my chart 5-  Last ov was 2018  Due for yearly fasting ov  Delmy  [phone] (home) [phone] (work)

## 2020-03-05 DIAGNOSIS — O36.80X0 PREGNANCY WITH INCONCLUSIVE FETAL VIABILITY: Primary | ICD-10-CM

## 2020-03-05 NOTE — PROGRESS NOTES
Pt has NOB scheduled with ultrasound. Needed order to be placed. Future ultrasound order placed and linked with appt. Closing encounter.   Cecille Bustamante RN on 3/5/2020 at 3:51 PM

## 2020-03-10 NOTE — PATIENT INSTRUCTIONS
Thank you for coming to see the Midwives at the   [facility]!      We will notify you about your labs that were drawn today once we get the results back.  If you have "[employer]hart your lab results will be posted there.      Someone from the clinic will call you personally or send you a [employer] message with your results.      If you need any refills of medications please call your pharmacy and they will contact us.      If you have a medical emergency please call 911.      If you have any concerns about today's visit, wish to schedule another appointment, or have an urgent medical concern please call our office at [phone]. You can also make appointments through [employer].      After hours you may also call the clinic number above to be connected with [place]'s after hours triage nurse.  The nurse can page the midwife on call if needed. There is always a midwife on call 24 hours a day.    Prenatal Care Recommendations:    Before 14 weeks: Dating ultrasound, genetic testing       This ultrasound helps us determine your dates accurately. Innatal (genetic screening test) can be drawn anytime after 10 weeks of gestation.    16 weeks: Optional genetic testing single AFP       This testing helps understand your baby's risk for some genetic abnormalities.    18-22 weeks:  Screening anatomy ultrasound       This testing will look for early growth abnormalities, placenta location, and may tell the baby's gender if you wish to find out.    24-27 weeks: One hour diabetes test (GCT) and complete blood count       This test helps identify diabetes of pregnancy or gestational diabetes.  We also look   at the iron in your blood and how well your blood clots.    28 - 36 weeks: Tetanus shot (Tdap)       This shot helps protect you and your baby from whooping cough.    36 weeks and later: Group B Strep test (GBS)       This test helps predict if you need antibiotics in labor to prevent infection for your baby.    Anytime  September to April:  Flu shot       This shot helps protect you and your family from the flu.  This is especially important during pregnancy.        The typical schedule after your first visit today you can expect:     Visit 2 - 12-16 weeks  Visit 3 - 20 weeks  Visit 4 - 24 weeks  Visit 5 - 28 weeks  Visit 6 - 30 weeks  Visit 7 - 32 weeks  Visit 8 - 34 weeks  Visit 9 - 36 weeks  Weekly after 36 weeks until delivery.        Any time during or after your pregnancy you may experience increased depression and/or mood changes.    We are here to support you. Please contact us if you are:    Feeling anxious    Overwhelmed or sad     Trouble sleeping    Crying uncontrollably    Trouble caring for yourself or baby.    Any thoughts of hurting yourself, your baby, or anyone else    If anything comes up between your visits or you have concerns please don't hesitate to contact us.    Secure access to your medical record:  Use [employer] (secure email communication and access to your chart) to send your primary care provider a message or make an appointment. Ask someone on your Team how to sign up for [employer]. To log on to [employer] or for more information in [employer] please visit the website at [email or web address].       Certified Nurse Midwife (CNM) Team    ELIDA Burciaga, KAILYN Rodriguez, ELIDA, [facility]  Nieves Esposito DNP, APRN, CNM  Ligia Krishnamurthy DNP, APRORLANDO, CNM      Again, thank you for choosing the midwives at [facility].  We are excited to be a part of your pregnancy. Please let us know how we can best partner with you to improve your and your family's health.    Remedies for nausea and vomiting with pregnancy      Eat small frequent meals every 2-3 hours if possible.       Avoid food at extremes of temperature and drinks with carbonation.      Eat foods that appeal to you, avoiding fats and spicy foods.      Avoid liquids with foods.  Drink liquids 30-60 minutes  before or after eating and sip slowly.      Bread, pasta, crackers, potatoes, and rice tend to be tolerated the best.      Don't worry about what you eat in the first 3 months, it is more important that you can eat and keep it down.       Try flat ginger ale or ginger tea      Before rising in the morning, eat a small amount of crackers or dry toast.      Peppermint tea      Ginger is a herbal remedy for nausea and you can use it in any form.  There are ginger tablets you can purchase.  The dose 1000 mg a day in divided doses.       You may also try doxylamine (Unisom) 12.5 mg three times a day which is a sleeping medication along with Vitamin B6 25 mg three times a day.  This combination takes up to a week to work so give it some time.       Benadryl (diphenhydramine) 25-50 mg every 8 hour or Dramamine (dimenhydrinate)  mg by mouth every 4-6 hours. Both of these medication may cause some drowsiness      Other things that may help include an Accupressure band and acupuncture      If these methods fail there are many prescriptions that we can try    If you begin to vomit more than 5 or 6 times a day and feel that you are unable to keep anything down, call the [facility] at [phone]    Genetic Screening in Pregnancy    There are several options you have for genetic screening in your pregnancy.  Everyone has their own personal reasons to screen or not to screen.  We want you to make the best choice for you and your pregnancy.  Below is a list of options, what they screen for and when the screening is done.  Genetic screening is recommended for women who are 35 and older at delivery and for those with a family history of chromosomal abnormalities. However, screening is offered to all women.    Innatal:    This is a screening for the more common chromosome abnormalities, including trisomy 21 (Down's syndrome), trisomy 18, trisomy 13 and sex chromosome abnormalities. This is a prenatal test that  "can be done as early as 10 weeks gestation.       A maternal blood sample is drawn that sequences cell-free DNA in maternal blood stream. This in turn allows for molecular counting of chromosome copy numbers with a >99% detection rate of Down syndrome, a 97% detection rate of Trisomy 18, and a 78% detection rate of Trisomy 13.    This test will give information about the gender of the baby.  You can choose to not have that disclosed.       Results come back within 10-14 business days.     About 5% of samples will have results that are designated as \"indeterminate\" or \"uninterruptable.\" With this type of result, genetic counseling and diagnostic testing are recommended. Remember this is a screening test and does not definitively diagnose or exclude the presence of these chromosome conditions.     This screening may or may not be covered by insurance.  We recommend you consult your insurance company to discuss.  Financial assistance is available at a low cost if not covered by your insurance.       Standard Screen:  This test screens for 23 disorders that cause serious health effects in infancy or childhood.  Most hereditary genetic disorders are autosomal recessive, meaning both parents must be carriers for the child to be at risk.  There are some X-linked disorders where only the mother needs to be a carrier for the child to be at risk.   Below are some of the more common of the 23 disorders screened for:     1.  Cystic Fibrosis (CF) is the most common life-shortening autosomal  recessive disease among  populations, with a frequency of 1 in  Every 3,500 live births. Although it mainly affects the   Population anyone can request screening. If you have a family history of  cystic fibrosis you should request this test.  This screening is a blood  draw.      2.  Spinal Muscular Atrophy (SMA) is the most common inherited cause  of infant death.  The most common form of this disorder causes death by " "a age two.  One in every 6,000 to 10,000 babies born in the [place] has SMA.      3.  Fragile X Syndrome (FXS) is the most common inherited cause of  intellectual disability.  Approximately 1 in every 3,600 boys and 1 in every  6,000 girls is born with FXS.      4.  Hemoglobinopathy Evaluation:  Hemoglobin electrophoresis is a  non-invasive blood test that looks at abnormal hemoglobin (component of  red blood cells) production. Examples of this include sickle cell anemia  (which is a disorder of the red blood cells and their shape) and the  thalassemia s (which is also a form of anemia).  High risk groups include:   , Southeast Asians, , Mediterranean, Vietnamese,  South and Central American and Chang descent. This is a one-time  test.     5. Shabbir-Sachs (optional):  Is a disorder that results when an enzyme that            helps break down fatty substances is absent.  This is a fatal disorder.                This is most commonly passed from parents who are of Ashkenazi Oriental orthodox  descent. One in four to one in five individuals of Ashkenazi Oriental orthodox  descent carry a mutation for one of the autosomal recessive disorders  included in a group of disorders sometimes call \"Oriental orthodox genetic  Disorders\".      **If you are a carrier of CF, SMA, or a hemoglobinopathy, your partner will also need to be tested. This partner testing is offered at a reduced cost.  This screen can be done prior to pregnancy or at any time during the pregnancy.      Maternal Serum AFP  The screening is ideally done between 15 and 18 weeks of pregnancy but can be done up to week 24. Even if you have done the Innatal testing you can still get a single AFP drawn to check for neural tube defects like Spina Bifida.       Anatomy Ultrasound:    This is a fetal anatomy survey done around 20 weeks gestation.  This ultrasound will look at the heart structure, heart activity, fetal heart rate and rhythm, assessment of the amniotic fluid volume, " placenta appearance and location, the umbilical cord and placental insertion site.  They also do many fetal measurements to make sure the baby is growing properly.  The cervical length is also measured and fetal movement is evaluated.  The gender of the baby can usually be determined.      In the event of a positive screen:    If any screening tests come back with an increased risk, you will be referred to Maternal Fetal Medicine to be seen by a genetic counselor and a doctor.  They will assess your risk and see if further diagnostic testing is warranted.  The options for diagnosis that may be offered are: chorionic villus sampling (CVS), usually done at 11 to 12 weeks of pregnancy and amniocentesis which is generally done later in pregnancy around 15 to 20 weeks.  All risks/benefits would be explained and you can decide what course of action is best for you and your family.    Why you might choose to screen?    A desire to know as much as you can about your baby    If your baby had a genetic abnormality you can learn about it before they are born    Choose whether to continue the pregnancy or to terminate    Why you might choose to NOT screen?    You feel that whatever happens is fine and you would not terminate    You know you don't want to do any diagnostic tests even if the screening test showed a high possibility of a genetic abnormality      For further information please call:  [facility]   [phone]      Fish Safety During Pregnancy    Often time's women worry about eating fish during pregnancy. Fish can be totally safe to eat during pregnancy.However, some fish may contain contaminants that could harm you or your baby if you eat certain types of fish or eat fish too often. Below is a list of which types of fish to eat, how often to eat fish, and which types of fish to avoid while pregnant.    Reasons to eat fish:    Fish is a great source of protein, vitamins, and minerals.    The oils  found in fish are important to unborn and breast fed babies    Eating fish may play a role in the prevention of heart disease in adults       Fish to EAT while pregnant   2 servings per week of any of these types of fish    Catfish (farm raised)  Cod    Crab    Kang    Flatfish   Oysters    [place]   [place] not [facility] [place])     Sardines   Scallops    Shrimp   Tilapia   1 serving per week of any of these fish    Canned LIGHT tuna     [place] caught-        Harmeet Mendozaie   [place]    Yellow Perch   1 serving per MONTH of these types of fish    Canned WHITE tuna  [place] Sea Urrutia    Grouper   Halibut    [place]    [place] Roughy    Tuna steak   [place] caught-    Bass    Catfish    [place] smaller than 20 inches    [place] smaller than 30 inches         Servings of fish should be based on your weight, 1 oz for every 20 lbs of body weight. For example: 130 lb person can safely eat 7 oz     150 lb person can safely eat 8 oz     170 lb person can safely eat 9 oz      Make sure to space out meals with fish throughout the month, don't eat all your fish meals for the month within a few days.       Fish to Avoid while Pregnant:      Shark   Collin Mackerel    Swordfish  Raw Sushi-any type of fish    Tile fish         [place] Caught:      Walleye longer than 20 in    [place] longer than 30 in    Musky      Contaminants:      Mercury comes from air pollution and small amounts of mercury can damage the brain that is just starting to form or grow. Too much mercury may affect a child's behavior and lead to learning problems later in life. Too much mercury in adults and older children may cause tingling, numbness in hands and feet, or vision changes    PCBs a man-made substance once used in electrical transformers but was banned in 1967 although it can still be found in the [place]. A baby who are exposed to PCBs during pregnancy may have a lower birth weight, reduced head size,  and delayed physical development. Exposure to PCBs may also cause cancer    PFOS is a man-made chemical to make products that resist heat, oil, stains, and grease. Studies in lab animals exposed to low levels of PFOS showed decreas HDL (good cholesterol) and changes in thyroid hormone levels. The concern about PFOS is with long-term exposure such as consuming large amounts over a long period of time     Mercury and PFOS cannot be removed through cooking or cleaning. By removing fat when cleaning and cooking you can reduce PCBs.      For more information and up to date information about fish safety in [place] please contact the [place] Department of Health ([facility]) or the Department of Natural Resources (DNR):    www.health.[facility].mn.  DNR: [phone]  [facility]: [phone]    Recommendations for total and rate of weight gain during pregnancy, by prepregnancy BMI    Total weight gain Rates of weight gain*  2nd and 3rd trimester   Prepregnancy BMI Range in kg Range in lbs Mean (range) in kg/week Mean (range) in lbs/week   Underweight (<18.5 kg/m2) 12.5-18 28-40 0.51 (0.44-0.58) 1 (1-1.3)   Normal weight (18.5-24.9 kg/m2) 11.5-16 25-35 0.42 (0.35-0.50) 1 (0.8-1)   Overweight (25.0-29.9 kg/m2) 7-11.5 15-25 0.28 (0.23-0.33) 0.6 (0.5-0.7)   Obese (?30.0 kg/m2) 5-9 11-20 0.22 (0.17-0.27) 0.5 (0.4-0.6)   BMI: body mass index.  * Calculations assume a 0.5-2 kg (1.1-4.4 lbs) weight gain in the first trimester.  * To calculate BMI go to [email or web address]/         Pt p/w SOB and productive cough x 2 weeks worse today.

## 2020-03-12 ENCOUNTER — PRENATAL OFFICE VISIT (OUTPATIENT)
Dept: MIDWIFE SERVICES | Facility: CLINIC | Age: 35
End: 2020-03-12
Payer: COMMERCIAL

## 2020-03-12 ENCOUNTER — ANCILLARY PROCEDURE (OUTPATIENT)
Dept: ULTRASOUND IMAGING | Facility: CLINIC | Age: 35
End: 2020-03-12
Payer: COMMERCIAL

## 2020-03-12 VITALS
HEIGHT: 70 IN | DIASTOLIC BLOOD PRESSURE: 72 MMHG | SYSTOLIC BLOOD PRESSURE: 110 MMHG | WEIGHT: 235.6 LBS | HEART RATE: 87 BPM | BODY MASS INDEX: 33.73 KG/M2

## 2020-03-12 DIAGNOSIS — O99.211 OBESITY AFFECTING PREGNANCY IN FIRST TRIMESTER: ICD-10-CM

## 2020-03-12 DIAGNOSIS — O36.80X0 PREGNANCY WITH INCONCLUSIVE FETAL VIABILITY: ICD-10-CM

## 2020-03-12 DIAGNOSIS — Z13.79 GENETIC SCREENING: ICD-10-CM

## 2020-03-12 DIAGNOSIS — Z34.01 ENCOUNTER FOR SUPERVISION OF NORMAL FIRST PREGNANCY IN FIRST TRIMESTER: Primary | ICD-10-CM

## 2020-03-12 PROBLEM — Z34.00 SUPERVISION OF NORMAL FIRST PREGNANCY: Status: ACTIVE | Noted: 2020-03-12

## 2020-03-12 LAB
ABO + RH BLD: NORMAL
ABO + RH BLD: NORMAL
ALBUMIN UR-MCNC: NEGATIVE MG/DL
APPEARANCE UR: CLEAR
BACTERIA #/AREA URNS HPF: ABNORMAL /HPF
BILIRUB UR QL STRIP: NEGATIVE
BLD GP AB SCN SERPL QL: NORMAL
BLOOD BANK CMNT PATIENT-IMP: NORMAL
COLOR UR AUTO: YELLOW
ERYTHROCYTE [DISTWIDTH] IN BLOOD BY AUTOMATED COUNT: 13.2 % (ref 10–15)
GLUCOSE UR STRIP-MCNC: NEGATIVE MG/DL
HBA1C MFR BLD: 5.2 % (ref 0–5.6)
HCT VFR BLD AUTO: 38.4 % (ref 35–47)
HGB BLD-MCNC: 13.1 G/DL (ref 11.7–15.7)
HGB UR QL STRIP: ABNORMAL
KETONES UR STRIP-MCNC: ABNORMAL MG/DL
LEUKOCYTE ESTERASE UR QL STRIP: NEGATIVE
MCH RBC QN AUTO: 29.6 PG (ref 26.5–33)
MCHC RBC AUTO-ENTMCNC: 34.1 G/DL (ref 31.5–36.5)
MCV RBC AUTO: 87 FL (ref 78–100)
NITRATE UR QL: NEGATIVE
NON-SQ EPI CELLS #/AREA URNS LPF: ABNORMAL /LPF
PH UR STRIP: 6 PH (ref 5–7)
PLATELET # BLD AUTO: 239 10E9/L (ref 150–450)
RBC # BLD AUTO: 4.43 10E12/L (ref 3.8–5.2)
RBC #/AREA URNS AUTO: ABNORMAL /HPF
SOURCE: ABNORMAL
SP GR UR STRIP: 1.02 (ref 1–1.03)
SPECIMEN EXP DATE BLD: NORMAL
UROBILINOGEN UR STRIP-ACNC: 0.2 EU/DL (ref 0.2–1)
WBC # BLD AUTO: 11.3 10E9/L (ref 4–11)
WBC #/AREA URNS AUTO: ABNORMAL /HPF

## 2020-03-12 PROCEDURE — 86901 BLOOD TYPING SEROLOGIC RH(D): CPT | Performed by: ADVANCED PRACTICE MIDWIFE

## 2020-03-12 PROCEDURE — 87086 URINE CULTURE/COLONY COUNT: CPT | Performed by: ADVANCED PRACTICE MIDWIFE

## 2020-03-12 PROCEDURE — 81001 URINALYSIS AUTO W/SCOPE: CPT | Performed by: ADVANCED PRACTICE MIDWIFE

## 2020-03-12 PROCEDURE — 84443 ASSAY THYROID STIM HORMONE: CPT | Performed by: ADVANCED PRACTICE MIDWIFE

## 2020-03-12 PROCEDURE — 76817 TRANSVAGINAL US OBSTETRIC: CPT | Performed by: OBSTETRICS & GYNECOLOGY

## 2020-03-12 PROCEDURE — 86900 BLOOD TYPING SEROLOGIC ABO: CPT | Performed by: ADVANCED PRACTICE MIDWIFE

## 2020-03-12 PROCEDURE — 87340 HEPATITIS B SURFACE AG IA: CPT | Performed by: ADVANCED PRACTICE MIDWIFE

## 2020-03-12 PROCEDURE — 85027 COMPLETE CBC AUTOMATED: CPT | Performed by: ADVANCED PRACTICE MIDWIFE

## 2020-03-12 PROCEDURE — 36415 COLL VENOUS BLD VENIPUNCTURE: CPT | Performed by: ADVANCED PRACTICE MIDWIFE

## 2020-03-12 PROCEDURE — 86762 RUBELLA ANTIBODY: CPT | Performed by: ADVANCED PRACTICE MIDWIFE

## 2020-03-12 PROCEDURE — 87389 HIV-1 AG W/HIV-1&-2 AB AG IA: CPT | Performed by: ADVANCED PRACTICE MIDWIFE

## 2020-03-12 PROCEDURE — 86850 RBC ANTIBODY SCREEN: CPT | Performed by: ADVANCED PRACTICE MIDWIFE

## 2020-03-12 PROCEDURE — 86780 TREPONEMA PALLIDUM: CPT | Performed by: ADVANCED PRACTICE MIDWIFE

## 2020-03-12 PROCEDURE — 83036 HEMOGLOBIN GLYCOSYLATED A1C: CPT | Performed by: ADVANCED PRACTICE MIDWIFE

## 2020-03-12 PROCEDURE — 99207 ZZC PRENATAL VISIT: CPT | Performed by: ADVANCED PRACTICE MIDWIFE

## 2020-03-12 RX ORDER — VITAMIN A ACETATE, .BETA.-CAROTENE, ASCORBIC ACID, CHOLECALCIFEROL, .ALPHA.-TOCOPHEROL ACETATE, DL-, THIAMINE MONONITRATE, RIBOFLAVIN, NIACINAMIDE, PYRIDOXINE HYDROCHLORIDE, FOLIC ACID, CYANOCOBALAMIN, CALCIUM CARBONATE, FERROUS FUMARATE, ZINC OXIDE, AND CUPRIC OXIDE 2000; 2000; 120; 400; 22; 1.84; 3; 20; 10; 1; 12; 200; 27; 25; 2 [IU]/1; [IU]/1; MG/1; [IU]/1; MG/1; MG/1; MG/1; MG/1; MG/1; MG/1; UG/1; MG/1; MG/1; MG/1; MG/1
1 TABLET ORAL DAILY
COMMUNITY

## 2020-03-12 ASSESSMENT — PATIENT HEALTH QUESTIONNAIRE - PHQ9
SUM OF ALL RESPONSES TO PHQ QUESTIONS 1-9: 3
5. POOR APPETITE OR OVEREATING: NOT AT ALL

## 2020-03-12 ASSESSMENT — ANXIETY QUESTIONNAIRES
1. FEELING NERVOUS, ANXIOUS, OR ON EDGE: SEVERAL DAYS
3. WORRYING TOO MUCH ABOUT DIFFERENT THINGS: NOT AT ALL
6. BECOMING EASILY ANNOYED OR IRRITABLE: SEVERAL DAYS
7. FEELING AFRAID AS IF SOMETHING AWFUL MIGHT HAPPEN: NOT AT ALL
2. NOT BEING ABLE TO STOP OR CONTROL WORRYING: NOT AT ALL
IF YOU CHECKED OFF ANY PROBLEMS ON THIS QUESTIONNAIRE, HOW DIFFICULT HAVE THESE PROBLEMS MADE IT FOR YOU TO DO YOUR WORK, TAKE CARE OF THINGS AT HOME, OR GET ALONG WITH OTHER PEOPLE: NOT DIFFICULT AT ALL
5. BEING SO RESTLESS THAT IT IS HARD TO SIT STILL: NOT AT ALL
GAD7 TOTAL SCORE: 2

## 2020-03-12 ASSESSMENT — MIFFLIN-ST. JEOR: SCORE: 1858.92

## 2020-03-12 NOTE — PROGRESS NOTES
"  SUBJECTIVE:     HPI:  This is a 34 year old female patient,  who presents for her first obstetrical visit.    JOEL: 10/7/2020, by Last Menstrual Period.  She is 10w1d weeks.  Her cycles are regular.  Her last menstrual period was normal.   Since her LMP, she has experienced  nausea, fatigue, lightheadedness, constipation and heartburn).   She denies emesis, abdominal pain, headache, vaginal discharge, dysuria, pelvic pain, urinary urgency, urinary frequency, vaginal bleeding and hemorrhoids.    Additional History:     Have you travelled during the pregnancy?No  Have your sexual partner(s) travelled during the pregnancy?No      HISTORY:   Planned Pregnancy: Yes  Marital Status: Single  Occupation: Dispatcher  Living in Household: Spouse    Past History:  Her past medical history is non-contributory.      She has a history of  none    Since her last LMP she denies use of alcohol, tobacco and street drugs.    Past medical, surgical, social and family history were reviewed and updated in [facility].        Current Outpatient Medications   Medication     Prenatal Vit-Fe Fumarate-FA (PNV PRENATAL PLUS MULTIVITAMIN) 27-1 MG TABS per tablet     No current facility-administered medications for this visit.        ROS:   12 point review of systems negative other than symptoms noted below or in the HPI.  Constitutional: Fatigue and Loss of Appetite  Cardiovascular: Lightheadedness  Gastrointestinal: Constipation and Nausea      OBJECTIVE:     EXAM:  /72 (BP Location: Right arm, Patient Position: Sitting, Cuff Size: Adult Large)   Pulse 87   Ht 1.778 m (5' 10\")   Wt 106.9 kg (235 lb 9.6 oz)   LMP 2020   Breastfeeding No   BMI 33.81 kg/m   Body mass index is 33.81 kg/m .    GENERAL: healthy, alert and no distress  NECK: no adenopathy, no asymmetry, masses, or scars and thyroid normal to palpation  RESP: lungs clear to auscultation - no rales, rhonchi or wheezes  BREAST: normal without masses, tenderness or nipple " discharge and no palpable axillary masses or adenopathy  CV: regular rate and rhythm, normal S1 S2, no S3 or S4, no murmur, click or rub.  ABDOMEN: soft, nontender, no hepatosplenomegaly, no masses and bowel sounds normal  MS: no gross musculoskeletal defects noted, no edema  PSYCH: mentation appears normal, affect normal/bright    ASSESSMENT/PLAN:       ICD-10-CM    1. Encounter for supervision of normal first pregnancy in first trimester  Z34.01 ABO/Rh type and screen     Hepatitis B surface antigen     CBC with platelets     HIV Antigen Antibody Combo     Rubella Antibody IgG Quantitative     Treponema Abs w Reflex to RPR and Titer     Urine Culture Aerobic Bacterial     *UA reflex to Microscopic     Urine Microscopic     TSH with free T4 reflex   2. Genetic screening  Z13.79 Non Invasive Prenatal Test Cell Free DNA   3. Obesity affecting pregnancy in first trimester  O99.211 Hemoglobin A1c       34 year old , 10w1d weeks of pregnancy with JOEL of 10/7/2020, by Last Menstrual Period      COUNSELING    Instructed on use of triage nurse line and contacting the on call CNM after hours in an emergency.     Symptoms of N&V and fatigue usually start to resolve around 12-16 weeks. Strategies discussed    Reviewed CNM philosophy, call schedule for labor and delivery, and FSH for delivery    1st OB handout given outlining appointment spacing and CNM information    Reviewed exercise and nutrition    Recommend to gain 11-20 pounds with her pregnancy.    Discussed OTC medications. OB med list given    Encouraged patient to take PNV's/DHA    Will check with insurance regarding genetic screening.     Will call patient with lab results when available      Will return to the clinic in 4 weeks for her next routine prenatal check.  Will call to be seen sooner if problems arise.      KAILYN Melton CNM

## 2020-03-13 LAB
BACTERIA SPEC CULT: NO GROWTH
HBV SURFACE AG SERPL QL IA: NONREACTIVE
HIV 1+2 AB+HIV1 P24 AG SERPL QL IA: NONREACTIVE
Lab: NORMAL
RUBV IGG SERPL IA-ACNC: 68 IU/ML
SPECIMEN SOURCE: NORMAL
T PALLIDUM AB SER QL: NONREACTIVE
TSH SERPL DL<=0.005 MIU/L-ACNC: 2.44 MU/L (ref 0.4–4)

## 2020-03-13 ASSESSMENT — ANXIETY QUESTIONNAIRES: GAD7 TOTAL SCORE: 2

## 2020-03-13 NOTE — RESULT ENCOUNTER NOTE
Mary Gomes,    All of your lab results look great. We are still waiting on the final urine culture results. I expect they will be normal. If they are not, then I will contact you again. Additionally, we will contact you after we obtain the results of your genetic test. Otherwise we will see you again at your next visit. Please let me know if you have any questions or concerns.     Thanks,  Nieves Esposito, DNP, APRN, CNM

## 2020-03-22 ENCOUNTER — HEALTH MAINTENANCE LETTER (OUTPATIENT)
Age: 35
End: 2020-03-22

## 2020-04-09 ENCOUNTER — VIRTUAL VISIT (OUTPATIENT)
Dept: MIDWIFE SERVICES | Facility: CLINIC | Age: 35
End: 2020-04-09
Payer: COMMERCIAL

## 2020-04-09 DIAGNOSIS — Z34.02 ENCOUNTER FOR SUPERVISION OF NORMAL FIRST PREGNANCY IN SECOND TRIMESTER: ICD-10-CM

## 2020-04-09 DIAGNOSIS — Z3A.14 14 WEEKS GESTATION OF PREGNANCY: ICD-10-CM

## 2020-04-09 DIAGNOSIS — O99.211 OBESITY AFFECTING PREGNANCY IN FIRST TRIMESTER: Primary | ICD-10-CM

## 2020-04-09 PROCEDURE — 99207 ZZC PRENATAL VISIT: CPT | Performed by: NURSE PRACTITIONER

## 2020-04-09 RX ORDER — MULTIVIT-MIN/IRON/FOLIC ACID/K 18-600-40
CAPSULE ORAL EVERY OTHER DAY
COMMUNITY
End: 2021-06-23

## 2020-04-09 NOTE — PATIENT INSTRUCTIONS
"Genetic Screening in Pregnancy    There are several options you have for genetic screening in your pregnancy.  Everyone has their own personal reasons to screen or not to screen.  We want you to make the best choice for you and your pregnancy.  Below is a list of options, what they screen for and when the screening is done.  Genetic screening is recommended for women who are 35 and older at delivery and for those with a family history of chromosomal abnormalities. However, screening is offered to all women.    Innatal:    This is a screening for the more common chromosome abnormalities, including trisomy 21 (Down's syndrome), trisomy 18, trisomy 13 and sex chromosome abnormalities. This is a prenatal test that can be done as early as 10 weeks gestation.       A maternal blood sample is drawn that sequences cell-free DNA in maternal blood stream. This in turn allows for molecular counting of chromosome copy numbers with a >99% detection rate of Down syndrome, a 97% detection rate of Trisomy 18, and a 78% detection rate of Trisomy 13.    This test will give information about the gender of the baby.  You can choose to not have that disclosed.       Results come back within 10-14 business days.     About 5% of samples will have results that are designated as \"indeterminate\" or \"uninterruptable.\" With this type of result, genetic counseling and diagnostic testing are recommended. Remember this is a screening test and does not definitively diagnose or exclude the presence of these chromosome conditions.     This screening may or may not be covered by insurance.  We recommend you consult your insurance company to discuss.  Financial assistance is available at a low cost if not covered by your insurance.       Standard Screen:  This test screens for 23 disorders that cause serious health effects in infancy or childhood.  Most hereditary genetic disorders are autosomal recessive, meaning both parents must be carriers for the " "child to be at risk.  There are some X-linked disorders where only the mother needs to be a carrier for the child to be at risk.   Below are some of the more common of the 23 disorders screened for:     1.  Cystic Fibrosis (CF) is the most common life-shortening autosomal  recessive disease among  populations, with a frequency of 1 in  Every 3,500 live births. Although it mainly affects the   Population anyone can request screening. If you have a family history of  cystic fibrosis you should request this test.  This screening is a blood  draw.      2.  Spinal Muscular Atrophy (SMA) is the most common inherited cause  of infant death.  The most common form of this disorder causes death by a age two.  One in every 6,000 to 10,000 babies born in the  has SMA.      3.  Fragile X Syndrome (FXS) is the most common inherited cause of  intellectual disability.  Approximately 1 in every 3,600 boys and 1 in every  6,000 girls is born with FXS.      4.  Hemoglobinopathy Evaluation:  Hemoglobin electrophoresis is a  non-invasive blood test that looks at abnormal hemoglobin (component of  red blood cells) production. Examples of this include sickle cell anemia  (which is a disorder of the red blood cells and their shape) and the  thalassemia s (which is also a form of anemia).  High risk groups include:   , Southeast Asians, , Mediterranean, [place],  South and Central American and [place] descent. This is a one-time  test.     5. Shabbir-Sachs (optional):  Is a disorder that results when an enzyme that            helps break down fatty substances is absent.  This is a fatal disorder.                This is most commonly passed from parents who are of Ashkenazi Latter day  descent. One in four to one in five individuals of Ashkenazi Latter day  descent carry a mutation for one of the autosomal recessive disorders  included in a group of disorders sometimes call \"Latter day genetic  Disorders\".  "     **If you are a carrier of CF, SMA, or a hemoglobinopathy, your partner will also need to be tested. This partner testing is offered at a reduced cost.  This screen can be done prior to pregnancy or at any time during the pregnancy.      Maternal Serum AFP  The screening is ideally done between 15 and 18 weeks of pregnancy but can be done up to week 24. Even if you have done the Innatal testing you can still get a single AFP drawn to check for neural tube defects like Spina Bifida.       Anatomy Ultrasound:    This is a fetal anatomy survey done around 20 weeks gestation.  This ultrasound will look at the heart structure, heart activity, fetal heart rate and rhythm, assessment of the amniotic fluid volume, placenta appearance and location, the umbilical cord and placental insertion site.  They also do many fetal measurements to make sure the baby is growing properly.  The cervical length is also measured and fetal movement is evaluated.  The gender of the baby can usually be determined.      In the event of a positive screen:    If any screening tests come back with an increased risk, you will be referred to Maternal Fetal Medicine to be seen by a genetic counselor and a doctor.  They will assess your risk and see if further diagnostic testing is warranted.  The options for diagnosis that may be offered are: chorionic villus sampling (CVS), usually done at 11 to 12 weeks of pregnancy and amniocentesis which is generally done later in pregnancy around 15 to 20 weeks.  All risks/benefits would be explained and you can decide what course of action is best for you and your family.    Why you might choose to screen?    A desire to know as much as you can about your baby    If your baby had a genetic abnormality you can learn about it before they are born    Choose whether to continue the pregnancy or to terminate    Why you might choose to NOT screen?    You feel that whatever happens is fine and you would not  terminate    You know you don't want to do any diagnostic tests even if the screening test showed a high possibility of a genetic abnormality      For further information please call:  [facility]   [phone]

## 2020-04-09 NOTE — PROGRESS NOTES
"Mireya Ardon is a 34 year old female who is being evaluated via a billable telephone visit.      The patient has been notified of following:     \"This telephone visit will be conducted via a call between you and your physician/provider. We have found that certain health care needs can be provided without the need for a physical exam.  This service lets us provide the care you need with a short phone conversation.  If a prescription is necessary we can send it directly to your pharmacy.  If lab work is needed we can place an order for that and you can then stop by our lab to have the test done at a later time.    Telephone visits are billed at different rates depending on your insurance coverage. During this emergency period, for some insurers they may be billed the same as an in-person visit.  Please reach out to your insurance provider with any questions.    If during the course of the call the physician/provider feels a telephone visit is not appropriate, you will not be charged for this service.\"    Patient has given verbal consent for Telephone visit?  Yes    How would you like to obtain your AVS? Bautista    Mireya Ardon complains of  No chief complaint on file.      I have reviewed and updated the patient's Past Medical History, Social History, Family History and Medication List.    ALLERGIES  Patient has no known allergies.    Feels good.  States that nausea has resolved.  Has been having intermittent leg and foot cramps.  Fetal movement No  Denies loss of fluid/vb/contractions/pelvic pain  Discussed option for genetic screening, desires Innatal.  Will make lab only appt for Innatal.  Counseled on OTC remedies for leg cramps  Counseled on danger signs, signs and symptoms SAB  Anatomy ultrasound next visit between 18-22 weeks  In person OB visit 4-6 weeks    Ebonie AVINA CNM        "

## 2020-04-13 DIAGNOSIS — Z13.79 GENETIC SCREENING: ICD-10-CM

## 2020-04-13 PROCEDURE — 36415 COLL VENOUS BLD VENIPUNCTURE: CPT | Performed by: ADVANCED PRACTICE MIDWIFE

## 2020-04-13 PROCEDURE — 40000791 ZZHCL STATISTIC VERIFI PRENATAL TRISOMY 21,18,13: Mod: 90 | Performed by: ADVANCED PRACTICE MIDWIFE

## 2020-04-13 PROCEDURE — 99000 SPECIMEN HANDLING OFFICE-LAB: CPT | Performed by: ADVANCED PRACTICE MIDWIFE

## 2020-04-20 LAB — LAB SCANNED RESULT: NORMAL

## 2020-05-08 PROBLEM — O99.212 OBESITY AFFECTING PREGNANCY IN SECOND TRIMESTER: Status: ACTIVE | Noted: 2020-03-12

## 2020-05-26 ENCOUNTER — PRENATAL OFFICE VISIT (OUTPATIENT)
Dept: MIDWIFE SERVICES | Facility: CLINIC | Age: 35
End: 2020-05-26
Payer: COMMERCIAL

## 2020-05-26 ENCOUNTER — ANCILLARY PROCEDURE (OUTPATIENT)
Dept: ULTRASOUND IMAGING | Facility: CLINIC | Age: 35
End: 2020-05-26
Payer: COMMERCIAL

## 2020-05-26 ENCOUNTER — TELEPHONE (OUTPATIENT)
Dept: MIDWIFE SERVICES | Facility: CLINIC | Age: 35
End: 2020-05-26

## 2020-05-26 VITALS — DIASTOLIC BLOOD PRESSURE: 70 MMHG | WEIGHT: 237 LBS | SYSTOLIC BLOOD PRESSURE: 106 MMHG | BODY MASS INDEX: 34.01 KG/M2

## 2020-05-26 DIAGNOSIS — O99.212 OBESITY AFFECTING PREGNANCY IN SECOND TRIMESTER: ICD-10-CM

## 2020-05-26 DIAGNOSIS — Z34.02 ENCOUNTER FOR SUPERVISION OF NORMAL FIRST PREGNANCY IN SECOND TRIMESTER: Primary | ICD-10-CM

## 2020-05-26 DIAGNOSIS — O99.211 OBESITY AFFECTING PREGNANCY IN FIRST TRIMESTER: ICD-10-CM

## 2020-05-26 PROCEDURE — 99207 ZZC PRENATAL VISIT: CPT | Performed by: ADVANCED PRACTICE MIDWIFE

## 2020-05-26 PROCEDURE — 76805 OB US >/= 14 WKS SNGL FETUS: CPT | Performed by: OBSTETRICS & GYNECOLOGY

## 2020-05-26 NOTE — PROGRESS NOTES
Feels well overall. Loves the pictures from the ultrasound   Fetal movement: positive   Denies loss of fluid/vb/contractions  Anatomy ultrasound results discussed; to be reviewed by Verenice GU, having a Boy, Placenta:  posterior  GCT visit between 24-28 weeks, handout provided, reminded of longer appointment  Round ligament pain and comfort measures reviewed  Virtual visit in 4 weeks    Nieves Esposito, DNP, APRN, CNM

## 2020-05-26 NOTE — PATIENT INSTRUCTIONS
Round Ligament Pain    Pregnancy can entail many normal discomforts. One of those discomforts may be round ligament pain. Round ligament pain occurs as your uterus and your baby grow and the muscles begin to stretch more in your abdomen. Round ligament pain is normal and not dangerous but can be very uncomfortable      Round ligament pain is typically described as an unpleasant sensation that ranges from a sharp knifelike pain to dull intermittent pain in the lower abdominal/suprapubic area of a pregnant woman      Virtually all pregnant women will experience this pain at some point during their pregnancies      It typically manifests between 16 and 20 weeks of pregnancy and can be incredibly bothersome especially for women who remain very active during their pregnancies       Please discuss with your midwife if you are having this type of pain; sometimes the pain can be associated with other medical conditions so it is important for us to assess you just to make sure    Comfort measures    There are certain things you can do to cope with round ligament pain and ease the discomfort you are having      Pregnancy support belt      Tylenol      Hot/ice packs      Baths       Exercise such as yoga and swimming that help stretch muscles      Prenatal massage      Reflexology to waist and pelvic joints       Positioning such as side-lying and hands and knees, make sure your abdomen is  well supported with pillows while doing different positions    Calcium Rich Foods    All premenopausal or women of child-bearing age need to get at least 1000 mg of calcium per day from diet and/or supplementation. Post menopausal women should get at least 1200 mg from diet and/or supplementation.    Food     Amount   Calcium (mg)  Dairy  Yogurt     1 cup   400   Ice Cream/Frozen yogurt 1/2 cup  100  Milk 1% or 2%   1 cup   300  Cheese   1 oz    195-335   Cottage cheese 2%  1 cup   155  Fruits  Orange   1 medium  60  Pear    1  medium  19  Raisins    1/4 cup  18  Vegetables-fresh and/or cooked  Broccoli   1/2 cup  36  Bok Mina   1/2 cup  79  Stiven greens   1/2 cup  15  Carrots    1 medium  19  Iceberg lettuce  4 leaves  16  Nuts, Beans, Seeds  Canned baked beans   1/2 cup  100  Canned red kidney beans 1/2 cup  25-80  Canned navy beans  1/2 cup  64  Tofu with calcium sulfate  1/2 cup  150  Soybeans   1 cup   175  Soy milk    1 cup   10  Almonds    1/4 cup  74  Protein  Swansea   3 oz   181  Tuna, canned   3 oz   10  Turkey breast   3.5 oz   10  Egg (large)   1 egg   27  Peanut Butter   2 tbsp   11  Beef     3 oz   9  Chicken   1 leg   15  Grain  Amaranth (uncooked)  1 cup   298  Corn tortilla    1 medium  42  Rice (cooked)   1/2 cup  20  Waffle (frozen ~7in)  1   179  Bagel    1   23  Calcium fortified foods/drinks  Orange juice   1 cup   300  Cereal + milk   3/4 cup + 1/2 cup 400  Rice milk   1 cup   300  Lactaid milk    1 cup       GESTATIONAL DIABETES SCREENING    All pregnant women are screened at least once for diabetes as part of their prenatal care. A woman has gestational diabetes if she has high blood sugars for the first time during pregnancy.      Diabetes can harm your health and the health of your baby.  But if we find the diabetes early in pregnancy we will watch your health closely and prevent further problems.       We will check for gestational diabetes during your visit between 24-28 weeks visit. Please note you can not do this prior to 24 weeks of gestation.      Plan to spend about an hour at the clinic.  When you check in let us know that you will be having your diabetes screening that day.       We will give you a 50 gram glucose drink that you have 5 minutes to consume.  Exactly one hour later you will have draw blood from your arm to check your blood sugar level.      We will call you to let you know if your results are normal.  If the results are normal no more testing will be needed.  If your results are not  normal we will discuss follow up testing with you.        You may eat prior to the testing but it is not recommended to eat or drink very sweet things such as pop, juice, candy or dessert type foods.  Eat a high protein, low carb meals prior to testing.    If you have any questions please call:    [facility]    [phone]

## 2020-05-27 NOTE — ASSESSMENT & PLAN NOTE
FOB: Vinod    JOEL: Oct 7, 2020  A+ Placenta:  Sex: Boy  NIPT: WNL     Tdap:          Flu:           GBS:     Problems

## 2020-05-27 NOTE — RESULT ENCOUNTER NOTE
Mary Gomes,    Your ultrasound results show everything is great. Please contact us with any questions or concerns.    Thanks,  Nieves Esposito, DNP, APRN, CNM

## 2020-05-28 NOTE — TELEPHONE ENCOUNTER
Type of Paperwork received:  FMLA/STD     Date Rcvd:  5/26/2020    Rcvd From (Company name): [facility]     Provider:  Nieves Esposito    Placed in [employer]'s basket: 5/28/2020

## 2020-06-23 ENCOUNTER — VIRTUAL VISIT (OUTPATIENT)
Dept: MIDWIFE SERVICES | Facility: CLINIC | Age: 35
End: 2020-06-23
Payer: COMMERCIAL

## 2020-06-23 DIAGNOSIS — O99.212 OBESITY AFFECTING PREGNANCY IN SECOND TRIMESTER: ICD-10-CM

## 2020-06-23 DIAGNOSIS — Z34.02 ENCOUNTER FOR SUPERVISION OF NORMAL FIRST PREGNANCY IN SECOND TRIMESTER: Primary | ICD-10-CM

## 2020-06-23 PROCEDURE — 99207 ZZC PRENATAL VISIT: CPT | Mod: TEL | Performed by: ADVANCED PRACTICE MIDWIFE

## 2020-06-23 NOTE — PROGRESS NOTES
"Feels well overall.   Fetal movement: positive   Denies loss of fluid/vb/contractions  GCT next visit, handout provided, reminded of longer appointment  Tdap next visit; reviewed CDC recommendations and partner/family vaccination recommended as well  Water birth discussed, patient is somewhat interested  Discussed options for birthing classes. Discussed [facility], [facility], and [facility].  Need for Rhogam? No    Return to clinic 4 weeks    Nieves Esposito, JEANINE, APRN, CNM    Mireya Ardon is a 34 year old female who is being evaluated via a billable telephone visit.      The patient has been notified of following:     \"This telephone visit will be conducted via a call between you and your physician/provider. We have found that certain health care needs can be provided without the need for a physical exam.  This service lets us provide the care you need with a short phone conversation.  If a prescription is necessary we can send it directly to your pharmacy.  If lab work is needed we can place an order for that and you can then stop by our lab to have the test done at a later time.    Telephone visits are billed at different rates depending on your insurance coverage. During this emergency period, for some insurers they may be billed the same as an in-person visit.  Please reach out to your insurance provider with any questions.    If during the course of the call the physician/provider feels a telephone visit is not appropriate, you will not be charged for this service.\"    Patient has given verbal consent for Telephone visit?  Yes    What phone number would you like to be contacted at? [phone]    How would you like to obtain your AVS? MyChart    Phone call duration: 13 minutes  [phone]  Nieves Esposito, KAILYN CNM      "

## 2020-06-23 NOTE — PATIENT INSTRUCTIONS
GESTATIONAL DIABETES SCREENING    All pregnant women are screened at least once for diabetes as part of their prenatal care. A woman has gestational diabetes if she has high blood sugars for the first time during pregnancy.      Diabetes can harm your health and the health of your baby.  But if we find the diabetes early in pregnancy we will watch your health closely and prevent further problems.       We will check for gestational diabetes during your visit between 24-28 weeks visit. Please note you can not do this prior to 24 weeks of gestation.      Plan to spend about an hour at the clinic.  When you check in let us know that you will be having your diabetes screening that day.       We will give you a 50 gram glucose drink that you have 5 minutes to consume.  Exactly one hour later you will have draw blood from your arm to check your blood sugar level.      We will call you to let you know if your results are normal.  If the results are normal no more testing will be needed.  If your results are not normal we will discuss follow up testing with you.        You may eat prior to the testing but it is not recommended to eat or drink very sweet things such as pop, juice, candy or dessert type foods.  Eat a high protein, low carb meals prior to testing.    If you have any questions please call:    [facility]    [phone]

## 2020-07-15 DIAGNOSIS — Z34.03 ENCOUNTER FOR SUPERVISION OF NORMAL FIRST PREGNANCY IN THIRD TRIMESTER: Primary | ICD-10-CM

## 2020-07-15 DIAGNOSIS — Z23 NEED FOR TDAP VACCINATION: ICD-10-CM

## 2020-07-19 NOTE — PATIENT INSTRUCTIONS
"PREECLAMPSIA SIGNS AND SYMPTOMS    Preeclampsia is a dangerous condition that some women develop in the second half of pregnancy. It can also begin after the baby is born.  Preeclampsia causes high blood pressure and can cause problems with many organ systems in your body.  It can also affect the growth of your baby. The exact cause of preeclampsia is unknown, however, there are signs and symptoms to watch for:    -A bad headache that doesn't improve with Tylenol  -Visual changes such as spots, flashes of light, blurry vision  -Pain in the upper right part of your abdomen, especially under the ribs that doesn't go away  -Nausea and/or vomiting  -Feeling extremely tired  -Yellowing of the skin and/or eyes  -Feeling \"not quite right\" or that something is wrong  -An extreme amount of swelling (some swelling in pregnancy is very normal)    If your midwife feels that you are developing preeclampsia, you will have lab tests drawn and will be monitored very closely.     If you are experiencing anyof these symptoms, call the [facility] immediately at [phone].  SIGNS OF  LABOR    Labor is  if it happens more than three weeks before your due date.    It can be hard to know if you are in labor, since the symptoms can be like the normal feelings of pregnancy.  Often, the only difference is the symptoms increase or they don't go away.     Signs of  labor can include:      Contractions which can feel like period cramps or gas pain.  You may feel it in the lower part of your abdomen, in your back, or as a pressure feeling in your bottom.  It is often regular, coming every 5 or 10 minutes, and  lasting about 30-60 seconds. Some contractions are normal during pregnancy (Tony peraza contractions) but if you are feeling more than 5-6 in one hour that is NOT normal    If this occurs empty your bladder, then drink 2-3 glasses of water, eat a snack, and lay down on your left side. Put " your hand on your abdomen to count the contractions.  If after one hour of resting you have still had 5-6 contractions call your clinic right away.      If you feel a pop, gush, or trickle of fluid it may mean that your bag of water has broken and you should contact the clinic       You may also experience loose stools and/or rectal pressure       Listen to your body, if something doesn't seem right please call us at the clinic    Risk Factors      Previous  delivery    Bacterial Vaginosis- if you notice a fishy smell to your discharge or experience vaginal itching/discomfort you should be evaluated for infection    Smoking    Drug abuse    Adolescent (teen) pregnancy or advanced maternal age (AMA) age 35 and over    Dehydration (this may not cause  labor but it can cause contractions)    If you think you are in  labor we may do some lab testing in the clinic or send you to the hospital for evaluation    Please call us if you are concerned you are in  labor.    [facility]  [phone]         HEARTBURN PREVENTION    Heartburn can be incredibly uncomfortable and bothersome. It can worsen with pregnancy, certain medications, and other illnesses. Try the lifestyle tips and remedies to combat your symptoms. We encourage you to try these first before using medications.      Small frequent meals      Eat slowly       Separate eating solid foods from liquids by 30-60 minutes      Do not eat within 2 hour of going to sleep      Avoid going to sleep right after eating or prop yourself up on pillows to avoid being completely flat      Avoid acidic foods like tomatoes, spicy foods, caffeine, chocolate, peppermint, alcohol, sugar, white bread rice, and pasta (whole grains are easier to digest)      Chew gum or have a throat lozenge before eating to encourage salivation, saliva neutralizes stomach acid       Avoid tobacco and alcohol      Weight loss may also be helpful if you  are overweight      NATURAL REMEDIES FOR HEARTBURN  You should avoid overuse of standard over the counter (OTC) antacids since they neutralize hydrochloric acid and arrest digestion.  Neutralized stomach acids later rebound with an increased acid secretion after the antacid wears off, which in turn produces even more stomach acid.  Prolonged use of antacids can lead to diarrhea and constipation with nausea.  Additionally, brucellae, a beneficial bacteria found in some dairy products, is killed by antacids.  It is better to try these natural remedies first.       Papaya tablets, papaya or papaya leaf tea      Fennel seeds, cumin seeds, anise seeds, and dill seed, can be eaten alone or made into tea      Essential oils to massage into the upper abdomen chamomile, fennel, lemon balm, jeannine sandalwood                                                      Sip a small amount of apple cider vinegar with warm water      Raw almonds      Drink a glass of milk before bed      Supplement with digestive enzymes (can be found in most drug/health food) stores    Plain baked potatoes    Comfrey-pepsin tablets (digestive aid)    Eat raw apple peels  Medications    If natural remedies and lifestyle changes fail consider using OTC medications. Many OTC medications are available for heartburn and are often very effective.      Antacids like Tums, Mylanta, Maalox     Zantac/Pepcid    Prilosec     Most OTC medications are recommended for short term use only, if you continue to have issues despite all of these helpful methods we recommend you be seen by a primary care provider for evaluation.    For questions or concerns please call:    [facility]   [phone]

## 2020-07-19 NOTE — PROGRESS NOTES
Feels well.  States that she is having heartburn, using Tums PRN.    Fetal movement: positive, denies loss of fluid/vb/contractions  GCT, CBC drawn today  Tdap given: Yes  Rhogam: NA; A+  Anti Treponema drawn: No  Hep C drawn: Yes  Water birth consent signed: not yet, given to review.  Is considering.    Reviewed PTL precautions and S&S of PIH, patient verbalizes understanding and what to report  Hospital Registration reminder-online  Discussed recommendation for PO iron, OTC options for heartburn.  Requests prescription for iron and pepcid.  Return to clinic 2 weeks, desires to have in person visits    Ebonie AVINA CNM

## 2020-07-22 ENCOUNTER — PRENATAL OFFICE VISIT (OUTPATIENT)
Dept: MIDWIFE SERVICES | Facility: CLINIC | Age: 35
End: 2020-07-22
Payer: COMMERCIAL

## 2020-07-22 VITALS — WEIGHT: 241.4 LBS | BODY MASS INDEX: 34.64 KG/M2 | DIASTOLIC BLOOD PRESSURE: 62 MMHG | SYSTOLIC BLOOD PRESSURE: 106 MMHG

## 2020-07-22 DIAGNOSIS — R12 HEART BURN: ICD-10-CM

## 2020-07-22 DIAGNOSIS — Z23 NEED FOR TDAP VACCINATION: ICD-10-CM

## 2020-07-22 DIAGNOSIS — Z34.03 ENCOUNTER FOR SUPERVISION OF NORMAL FIRST PREGNANCY IN THIRD TRIMESTER: ICD-10-CM

## 2020-07-22 DIAGNOSIS — O99.213 OBESITY AFFECTING PREGNANCY IN THIRD TRIMESTER: Primary | ICD-10-CM

## 2020-07-22 DIAGNOSIS — Z11.59 NEED FOR HEPATITIS C SCREENING TEST: ICD-10-CM

## 2020-07-22 DIAGNOSIS — Z3A.29 29 WEEKS GESTATION OF PREGNANCY: ICD-10-CM

## 2020-07-22 DIAGNOSIS — O99.013 ANEMIA AFFECTING PREGNANCY IN THIRD TRIMESTER: ICD-10-CM

## 2020-07-22 LAB
ERYTHROCYTE [DISTWIDTH] IN BLOOD BY AUTOMATED COUNT: 13.4 % (ref 10–15)
GLUCOSE 1H P 50 G GLC PO SERPL-MCNC: 120 MG/DL (ref 60–129)
HCT VFR BLD AUTO: 37 % (ref 35–47)
HGB BLD-MCNC: 12.2 G/DL (ref 11.7–15.7)
MCH RBC QN AUTO: 29.1 PG (ref 26.5–33)
MCHC RBC AUTO-ENTMCNC: 33 G/DL (ref 31.5–36.5)
MCV RBC AUTO: 88 FL (ref 78–100)
PLATELET # BLD AUTO: 222 10E9/L (ref 150–450)
RBC # BLD AUTO: 4.19 10E12/L (ref 3.8–5.2)
WBC # BLD AUTO: 10.6 10E9/L (ref 4–11)

## 2020-07-22 PROCEDURE — 90715 TDAP VACCINE 7 YRS/> IM: CPT

## 2020-07-22 PROCEDURE — 85027 COMPLETE CBC AUTOMATED: CPT | Performed by: NURSE PRACTITIONER

## 2020-07-22 PROCEDURE — 36415 COLL VENOUS BLD VENIPUNCTURE: CPT | Performed by: NURSE PRACTITIONER

## 2020-07-22 PROCEDURE — 82950 GLUCOSE TEST: CPT | Performed by: NURSE PRACTITIONER

## 2020-07-22 PROCEDURE — 86803 HEPATITIS C AB TEST: CPT | Performed by: NURSE PRACTITIONER

## 2020-07-22 PROCEDURE — 99207 ZZC PRENATAL VISIT: CPT | Performed by: NURSE PRACTITIONER

## 2020-07-22 PROCEDURE — 90471 IMMUNIZATION ADMIN: CPT

## 2020-07-22 RX ORDER — LANOLIN ALCOHOL/MO/W.PET/CERES
1000 CREAM (GRAM) TOPICAL DAILY
Qty: 30 TABLET | Refills: 1 | Status: SHIPPED | OUTPATIENT
Start: 2020-07-22 | End: 2020-08-14

## 2020-07-22 RX ORDER — FAMOTIDINE 20 MG/1
20 TABLET, FILM COATED ORAL 2 TIMES DAILY PRN
Qty: 60 TABLET | Refills: 1 | Status: SHIPPED | OUTPATIENT
Start: 2020-07-22 | End: 2020-08-14

## 2020-07-22 RX ORDER — FERROUS SULFATE 325(65) MG
325 TABLET ORAL
Qty: 30 TABLET | Refills: 1 | Status: SHIPPED | OUTPATIENT
Start: 2020-07-22 | End: 2020-08-14

## 2020-07-22 RX ORDER — MULTIVIT WITH MINERALS/LUTEIN
250 TABLET ORAL DAILY
Qty: 30 TABLET | Refills: 1 | Status: SHIPPED | OUTPATIENT
Start: 2020-07-22 | End: 2020-08-14

## 2020-07-22 NOTE — PROGRESS NOTES
Prior to immunization administration, verified patients identity using patient s name and date of birth. Please see Immunization Activity for additional information.     Screening Questionnaire for Adult Immunization    Are you sick today?   No   Do you have allergies to medications, food, a vaccine component or latex?   No   Have you ever had a serious reaction after receiving a vaccination?   No   Do you have a long-term health problem with heart, lung, kidney, or metabolic disease (e.g., diabetes), asthma, a blood disorder, no spleen, complement component deficiency, a cochlear implant, or a spinal fluid leak?  Are you on long-term aspirin therapy?   No   Do you have cancer, leukemia, HIV/AIDS, or any other immune system problem?   No   Do you have a parent, brother, or sister with an immune system problem?   No   In the past 3 months, have you taken medications that affect  your immune system, such as prednisone, other steroids, or anticancer drugs; drugs for the treatment of rheumatoid arthritis, Crohn s disease, or psoriasis; or have you had radiation treatments?   No   Have you had a seizure, or a brain or other nervous system problem?   No   During the past year, have you received a transfusion of blood or blood    products, or been given immune (gamma) globulin or antiviral drug?   No   For women: Are you pregnant or is there a chance you could become       pregnant during the next month?   yes   Have you received any vaccinations in the past 4 weeks?   No     Immunization questionnaire answers were all negative.        Per orders of Ebonie Woods, injection of Tdap given by Raven Springer CMA. Patient instructed to remain in clinic for 15 minutes afterwards, and to report any adverse reaction to me immediately.       Screening performed by Raven Springer CMA on 7/22/2020 at 11:37 AM.

## 2020-07-23 LAB — HCV AB SERPL QL IA: NONREACTIVE

## 2020-07-23 NOTE — RESULT ENCOUNTER NOTE
Mary Gomes,    Your Hepatitis C results is negative - as expected. Please contact us with any questions or concerns.     Thank you,  Nieves Esposito, JEANINE, APRN, CNM

## 2020-08-05 ENCOUNTER — PRENATAL OFFICE VISIT (OUTPATIENT)
Dept: MIDWIFE SERVICES | Facility: CLINIC | Age: 35
End: 2020-08-05
Payer: COMMERCIAL

## 2020-08-05 VITALS — SYSTOLIC BLOOD PRESSURE: 124 MMHG | BODY MASS INDEX: 34.29 KG/M2 | WEIGHT: 239 LBS | DIASTOLIC BLOOD PRESSURE: 70 MMHG

## 2020-08-05 DIAGNOSIS — O99.212 OBESITY AFFECTING PREGNANCY IN SECOND TRIMESTER: Primary | ICD-10-CM

## 2020-08-05 DIAGNOSIS — Z3A.31 31 WEEKS GESTATION OF PREGNANCY: ICD-10-CM

## 2020-08-05 PROCEDURE — 99207 ZZC PRENATAL VISIT: CPT | Performed by: NURSE PRACTITIONER

## 2020-08-05 NOTE — PATIENT INSTRUCTIONS

## 2020-08-05 NOTE — PROGRESS NOTES
Feels well.  States that heartburn is much better with PO pepcid.  Asking for COVID and /peds recommendations  Fetal Movement: positive, denies loss of fluid/vb, no contractions  Fetal kick counts/movement reviewed  Reviewed PTL precautions and s/sx of preeclampsia; denies any S&S and aware of what to report     Peds chosen: Yes  Pediatrician: Hep B, Vit K, Erythromycin and circumcision; advised to check insurance for hospital vs clinic    Plans to breastfeed Yes  Breastfeeding class planned No   Has decided against waterbirth, but would like to use hydrotherapy and nitrous if possible.  Has hired a , will plan for [place] and  for support during labor.   AAP handout given re: COVID and newborns/breastfeeding  Encouraged to only research COVID on CDC, WHO, or AAP websites  Hospital COVID restrictions discussed    Return to clinic 3 weeks in person OB visit    Ebonie AVINA CNM, [facility]  [phone]

## 2020-08-13 DIAGNOSIS — R12 HEART BURN: ICD-10-CM

## 2020-08-13 DIAGNOSIS — O99.013 ANEMIA AFFECTING PREGNANCY IN THIRD TRIMESTER: ICD-10-CM

## 2020-08-14 RX ORDER — MULTIVIT WITH MINERALS/LUTEIN
250 TABLET ORAL DAILY
Qty: 30 TABLET | Refills: 1 | Status: SHIPPED | OUTPATIENT
Start: 2020-08-14 | End: 2020-09-17

## 2020-08-14 RX ORDER — LANOLIN ALCOHOL/MO/W.PET/CERES
1000 CREAM (GRAM) TOPICAL DAILY
Qty: 30 TABLET | Refills: 1 | Status: SHIPPED | OUTPATIENT
Start: 2020-08-14 | End: 2021-06-23

## 2020-08-14 RX ORDER — FERROUS SULFATE 325(65) MG
325 TABLET ORAL
Qty: 30 TABLET | Refills: 1 | Status: SHIPPED | OUTPATIENT
Start: 2020-08-14 | End: 2020-09-17

## 2020-08-14 RX ORDER — FAMOTIDINE 20 MG/1
20 TABLET, FILM COATED ORAL 2 TIMES DAILY PRN
Qty: 60 TABLET | Refills: 1 | Status: SHIPPED | OUTPATIENT
Start: 2020-08-14 | End: 2020-12-01

## 2020-08-14 NOTE — TELEPHONE ENCOUNTER
Prescription approved per [facility] Refill Protocol.  Cecille Bustamante RN on 8/14/2020 at 12:03 PM

## 2020-08-14 NOTE — TELEPHONE ENCOUNTER
"Requested Prescriptions   Pending Prescriptions Disp Refills     vitamin C (ASCORBIC ACID) 250 MG tablet 30 tablet 1     Sig: Take 1 tablet (250 mg) by mouth daily       There is no refill protocol information for this order        famotidine (PEPCID) 20 MG tablet 60 tablet 1     Sig: Take 1 tablet (20 mg) by mouth 2 times daily as needed (heartburn)       H2 Blockers Protocol Passed - 8/13/2020  7:13 PM        Passed - Patient is age 12 or older        Passed - Recent (12 mo) or future (30 days) visit within the authorizing provider's specialty     Patient has had an office visit with the authorizing provider or a provider within the authorizing providers department within the previous 12 mos or has a future within next 30 days. See \"Patient Info\" tab in inbasket, or \"Choose Columns\" in Meds & Orders section of the refill encounter.              Passed - Medication is active on med list           ferrous sulfate (FEROSUL) 325 (65 Fe) MG tablet 30 tablet 1     Sig: Take 1 tablet (325 mg) by mouth daily (with breakfast)       Iron Supplements Passed - 8/13/2020  7:13 PM        Passed - Patient is 12 years of age or older        Passed - Recent (12 mo) or future (30 days) visit within the authorizing provider's specialty     Patient has had an office visit with the authorizing provider or a provider within the authorizing providers department within the previous 12 mos or has a future within next 30 days. See \"Patient Info\" tab in inbasket, or \"Choose Columns\" in Meds & Orders section of the refill encounter.              Passed - Hgb OR Hct on record within the past 12 mos.     Patient need only have had a HGB or HCT on file in the past 12 mos. That result does not need to be normal.    Recent Labs   Lab Test 07/22/20  1017 03/12/20  1115 04/23/18  1729   HGB 12.2 13.1 13.8     Recent Labs   Lab Test 07/22/20  1017 03/12/20  1115 04/23/18  1729   HCT 37.0 38.4 43.1       Please verify a HGB or HCT has been checked " SINCE THE LAST DOSE CHANGE.            Passed - Medication is active on med list           cyanocobalamin (VITAMIN B-12) 1000 MCG tablet 30 tablet 1     Sig: Take 1 tablet (1,000 mcg) by mouth daily       There is no refill protocol information for this order          Last Written Prescription Date:  7/22/20  Last office visit with prescribing provider:  Ebonie Woods  Future Office Visit:   Next 5 appointments (look out 90 days)    Aug 26, 2020 10:20 AM CDT  ESTABLISHED PRENATAL with KAILYN Gonzalez CNM  [facility] [place] ([facility]) [address] [id number] [phone]

## 2020-08-25 NOTE — PATIENT INSTRUCTIONS
"PREECLAMPSIA SIGNS AND SYMPTOMS    Preeclampsia is a dangerous condition that some women develop in the second half of pregnancy. It can also begin after the baby is born.  Preeclampsia causes high blood pressure and can cause problems with many organ systems in your body.  It can also affect the growth of your baby. The exact cause of preeclampsia is unknown, however, there are signs and symptoms to watch for:    -A bad headache that doesn't improve with Tylenol  -Visual changes such as spots, flashes of light, blurry vision  -Pain in the upper right part of your abdomen, especially under the ribs that doesn't go away  -Nausea and/or vomiting  -Feeling extremely tired  -Yellowing of the skin and/or eyes  -Feeling \"not quite right\" or that something is wrong  -An extreme amount of swelling (some swelling in pregnancy is very normal)    If your midwife feels that you are developing preeclampsia, you will have lab tests drawn and will be monitored very closely.     If you are experiencing anyof these symptoms, call the [facility] immediately at [phone].    SIGNS OF  LABOR    Labor is  if it happens more than three weeks before your due date.    It can be hard to know if you are in labor, since the symptoms can be like the normal feelings of pregnancy.  Often, the only difference is the symptoms increase or they don't go away.     Signs of  labor can include:      Contractions which can feel like period cramps or gas pain.  You may feel it in the lower part of your abdomen, in your back, or as a pressure feeling in your bottom.  It is often regular, coming every 5 or 10 minutes, and  lasting about 30-60 seconds. Some contractions are normal during pregnancy ([place] peraza contractions) but if you are feeling more than 5-6 in one hour that is NOT normal    If this occurs empty your bladder, then drink 2-3 glasses of water, eat a snack, and lay down on your left side. Put " your hand on your abdomen to count the contractions.  If after one hour of resting you have still had 5-6 contractions call your clinic right away.      If you feel a pop, gush, or trickle of fluid it may mean that your bag of water has broken and you should contact the clinic       You may also experience loose stools and/or rectal pressure       Listen to your body, if something doesn't seem right please call us at the clinic    Risk Factors      Previous  delivery    Bacterial Vaginosis- if you notice a fishy smell to your discharge or experience vaginal itching/discomfort you should be evaluated for infection    Smoking    Drug abuse    Adolescent (teen) pregnancy or advanced maternal age (AMA) age 35 and over    Dehydration (this may not cause  labor but it can cause contractions)    If you think you are in  labor we may do some lab testing in the clinic or send you to the hospital for evaluation    Please call us if you are concerned you are in  labor.    [facility]  [phone]    Fetal Kick Counts    It is important to know when your baby's movements occur. We often get busy with work and life and do not pay close attention to their movements.        Women typically begin feeling movement between 18-22 weeks of gestation, sometimes it can be earlier or later depending on where your placenta is       Movements usually begin feeling like popping or fluttering and as the baby grows they become more pronounced    Toward the end of pregnancy as the baby gets larger they may not move as much or make as big of movements. Babies have maturing sleep cycles as well as not as much room to move and flip. If you are ever concerned about your baby's movements or have not felt the baby move for a while, we recommend you do a fetal kick count. Prior to starting your count drink a glass of water or juice and eat a snack. Then lay down on your side and begin to count  movements.     How to do a Fetal Kick Counts    There are many different ways to monitor your baby's movements. Movements can range from large jabs to small kicks, or wiggles.  Hiccups count!      Count 10 movements in 2 hours when resting and focusing    Count 10 movements in 12 hours when doing normal activity    We recommend that if movements occur but seem decreased that you should be seen in the clinic or hospital for evaluation within 12 hours. If fetal movement is absent or fetal kick counts are low please contact us right away.    If you ever have any concerns about your baby's movements DO NOT HESITATE to call us, we are here for you!    [facility]  [phone]      GROUP B STREP    Group B Strep (GBS) is a common bacteria that is sometimes found in the vagina, urinary tract or rectum.  It is not harmful typically to adults but can cause serious illness in newborns.  It occasionally is passed from mother to baby during birth.   It is important to test in pregnancy.  When a woman is found to be positive for GBS, either at the first prenatal visit or by taking a culture at 36 weeks, treatment will be offered to reduce the chance of spreading the bacteria to the baby.       Treatment consists of either oral antibiotics early in pregnancy or antibiotics given by IV during labor if testing is positive at 36 weeks.      Even without treatment the baby rarely (1-2% of the time) gets infected.  With treatment the baby almost never gets infected.       There really isn't anything you can do to keep from getting or being positive for GBS.  It isn't sexually transmitted and there are no symptoms if you are positive.       Your midwife will discuss your results with you and make recommendations for treatment.                                                     Infant Car Seat Safety   [place] law requires that all infants must be secured into a car seat while in a car.    General Car Seat  Guidelines:            Your car seat should be no more than six years old and should never have been in an accident.  Always know the history of your car seat.            Car seats should be installed in the back seat and should be rear-facing. The American Academy of Pediatrics recommends that all infants and toddlers should remain rear-facing for as long as possible, until they reach the weight or height limits allowed by their car seat. Follow your car seat 's instructions.            The straps on the car seat should be snug enough that you should not be able to pinch up slack with your fingers.            Never dress your baby in thick clothing, coats or blankets while riding in a car seat.  Secure the baby into the seat wearing clothing of normal thickness and then cover baby s lap with a blanket OVER the car seat straps, if needed.  Car Seat Clinics and Safety Information:            Schedule an appointment to have a professional check the installation of your car seat before your baby is born.    Go to: https://[place]ps.[place].gov/divisions/ots/child-passenger-safety/Pages/car-seat-checks.aspx         and click on your county.            Sign up for email alerts on child restraint/car seat recalls at:     http://www-marie.nhtsa.dot.gov/subscriptions/index.cfm             Find more information on car seat safety at:     http://www.safercar.gov/parents/CarSeats/Car-Seat-Safety.htm

## 2020-08-25 NOTE — PROGRESS NOTES
Feels well, no concerns today.   Fetal Movement: positive, denies loss of fluid/vb, no  contractions  Fetal kick counts/movement reviewed    Reviewed PTL precautions and s/sx of preeclampsia; denies any S&S and aware of what to report    Taking hospital tour?  Virtual tour information given. Reviewed parking and L&D location  Have car seat Yes  Discussed GBS/cx check at next visit  Return to clinic 2 weeks    KAILYN Box, CNM

## 2020-08-26 ENCOUNTER — PRENATAL OFFICE VISIT (OUTPATIENT)
Dept: MIDWIFE SERVICES | Facility: CLINIC | Age: 35
End: 2020-08-26
Payer: COMMERCIAL

## 2020-08-26 VITALS — DIASTOLIC BLOOD PRESSURE: 68 MMHG | WEIGHT: 245 LBS | BODY MASS INDEX: 35.15 KG/M2 | SYSTOLIC BLOOD PRESSURE: 124 MMHG

## 2020-08-26 DIAGNOSIS — Z34.03 ENCOUNTER FOR SUPERVISION OF NORMAL FIRST PREGNANCY IN THIRD TRIMESTER: Primary | ICD-10-CM

## 2020-08-26 PROCEDURE — 99207 ZZC PRENATAL VISIT: CPT | Performed by: ADVANCED PRACTICE MIDWIFE

## 2020-09-09 ENCOUNTER — PRENATAL OFFICE VISIT (OUTPATIENT)
Dept: MIDWIFE SERVICES | Facility: CLINIC | Age: 35
End: 2020-09-09
Payer: COMMERCIAL

## 2020-09-09 VITALS — SYSTOLIC BLOOD PRESSURE: 112 MMHG | WEIGHT: 252 LBS | BODY MASS INDEX: 36.16 KG/M2 | DIASTOLIC BLOOD PRESSURE: 80 MMHG

## 2020-09-09 DIAGNOSIS — Z34.03 ENCOUNTER FOR SUPERVISION OF NORMAL FIRST PREGNANCY IN THIRD TRIMESTER: ICD-10-CM

## 2020-09-09 PROCEDURE — 99207 ZZC PRENATAL VISIT: CPT | Performed by: ADVANCED PRACTICE MIDWIFE

## 2020-09-09 PROCEDURE — 87653 STREP B DNA AMP PROBE: CPT | Performed by: ADVANCED PRACTICE MIDWIFE

## 2020-09-09 NOTE — PATIENT INSTRUCTIONS
"Labor Instructions for Midwife Patients    When to call:  Both during and after office hours call  [phone]. There is a triage RN to take your calls and answer your questions 24 hours a day.  If she cannot answer your question she will page the midwife on call for you.    When to call:  Call anytime you have important concerns about you or your baby.     Call if:    You are having contractions at regular intervals about 5-6 minutes apart lasting 30-60 seconds and becoming increasingly more intense     You have an uncontrollable gush of fluid from your vagina or feel a pop and gush like your water has broken    You have HEAVY bleeding, like heavy period, blood running down your legs, or  soaking a pad.     Some bleeding after a pelvic exam, after intercourse, or in labor when your cervix is dilating is normal and is referred to as \"bloody show\"    You have severe, continuous back or abdominal pain    You feel it is time to go to the hospital    If this is your first labor, call when contractions are very intense and have been about every 3-4 minutes for about an hour    If it is your second labor or more, call when contractions are strong and about every 3-5 minutes or sooner depending on your level of discomfort.     Keep in mind we are always here for you! If you have questions, concerns please don't hesitate to call us.     What to eat/drink in labor: Drink plenty of fluid (water most importantly, juice, soda or tea without caffeine). Eat rice, pasta, soup, cereal, bread/toast, and fruit. Avoid dairy and greasy food as they are difficult to digest and you may experience some nausea during labor.    Comfort measures:    Baths and showers (ok even with ruptured membranes, it may temporarily slow contractions if you are still in the early stage of labor)    Warm/hot packs for back pain or discomfort    Back, belly, or thigh massages    Standing, rocking, walking, leaning over bed or tables, side-lying and " sleeping    Miscellaneous:     Contractions are timed from the beginning of one to the beginning of the next    Try hard to sleep during the early stage of labor when you are not that uncomfortable. Timing of contractions at this point is not important    Even if you cannot sleep, resting in bed or on the couch can help you maintain your energy for labor    When you arrive at the hospital the nurse will check your baby's heartbeat, check your cervix, and will call us. The midwife on call will come in and be with you when you are in active labor    After hours you need to enter the hospital through the emergency room        NATURAL LABOR PREPARATION    So, you're getting closer and closer to your due date and wondering what you can do to help get your body ready for labor.   Here are some natural methods you can try:    NOTE: DO NOT begin any of the following prior to 36 completed weeks of pregnancy!    Evening Primrose Oil: Take 1000mg by mouth three times per day. This encourages the cervix to ripen. Cervical ripening is when your cervix becomes more soft and stretchy to get ready for dilation and effacement.     Red Raspberry Leaf Tea: Red raspberry tea (get it at a Co-op or in the grocery store). Drink three cups per day (hot or cold). This can help to prepare the uterine muscles for labor.      Fetal Kick Counts    It is important to know when your baby's movements occur. We often get busy with work and life and do not pay close attention to their movements.        Women typically begin feeling movement between 18-22 weeks of gestation, sometimes it can be earlier or later depending on where your placenta is       Movements usually begin feeling like popping or fluttering and as the baby grows they become more pronounced    Toward the end of pregnancy as the baby gets larger they may not move as much or make as big of movements. Babies have maturing sleep cycles as well as not as much room to move and flip. If  "you are ever concerned about your baby's movements or have not felt the baby move for a while, we recommend you do a fetal kick count. Prior to starting your count drink a glass of water or juice and eat a snack. Then lay down on your side and begin to count movements.       PREECLAMPSIA SIGNS AND SYMPTOMS    Preeclampsia is a dangerous condition that some women develop in the second half of pregnancy. It can also begin after the baby is born.  Preeclampsia causes high blood pressure and can cause problems with many organ systems in your body.  It can also affect the growth of your baby. The exact cause of preeclampsia is unknown, however, there are signs and symptoms to watch for:    -A bad headache that doesn't improve with Tylenol  -Visual changes such as spots, flashes of light, blurry vision  -Pain in the upper right part of your abdomen, especially under the ribs that doesn't go away  -Nausea and/or vomiting  -Feeling extremely tired  -Yellowing of the skin and/or eyes  -Feeling \"not quite right\" or that something is wrong  -An extreme amount of swelling (some swelling in pregnancy is very normal)    If your midwife feels that you are developing preeclampsia, you will have lab tests drawn and will be monitored very closely.     If you are experiencing anyof these symptoms, call the [facility] immediately at [phone].      "

## 2020-09-09 NOTE — PROGRESS NOTES
Feels well  Fetal movement: positive  Denies bleeding/lof, no contractions  GBS swab done today  Vaginal exam done, Confirmed vertex via leopolds and bedside US   Labor instructions given  Breast pump Rx yes, provided    Warning signs reviewed  Patient denies S&S of pre-eclampsia and aware of what to report     Return to clinic 1 week    Mikki AVINA CNM    Next visit: Review birth plan if she has met with Garrte

## 2020-09-10 LAB
GP B STREP DNA SPEC QL NAA+PROBE: NEGATIVE
SPECIMEN SOURCE: NORMAL

## 2020-09-17 DIAGNOSIS — O99.013 ANEMIA AFFECTING PREGNANCY IN THIRD TRIMESTER: ICD-10-CM

## 2020-09-17 RX ORDER — MULTIVIT WITH MINERALS/LUTEIN
250 TABLET ORAL DAILY
Qty: 30 TABLET | Refills: 1 | Status: SHIPPED | OUTPATIENT
Start: 2020-09-17 | End: 2020-10-02

## 2020-09-17 RX ORDER — FERROUS SULFATE 325(65) MG
325 TABLET ORAL
Qty: 30 TABLET | Refills: 1 | Status: SHIPPED | OUTPATIENT
Start: 2020-09-17 | End: 2020-10-02

## 2020-09-17 NOTE — TELEPHONE ENCOUNTER
"Requested Prescriptions   Pending Prescriptions Disp Refills     ferrous sulfate (FEROSUL) 325 (65 Fe) MG tablet 30 tablet 1     Sig: Take 1 tablet (325 mg) by mouth daily (with breakfast)       Iron Supplements Passed - 9/17/2020  2:27 PM        Passed - Patient is 12 years of age or older        Passed - Recent (12 mo) or future (30 days) visit within the authorizing provider's specialty     Patient has had an office visit with the authorizing provider or a provider within the authorizing providers department within the previous 12 mos or has a future within next 30 days. See \"Patient Info\" tab in inbasket, or \"Choose Columns\" in Meds & Orders section of the refill encounter.              Passed - Hgb OR Hct on record within the past 12 mos.     Patient need only have had a HGB or HCT on file in the past 12 mos. That result does not need to be normal.    Recent Labs   Lab Test 07/22/20  1017 03/12/20  1115 04/23/18  1729   HGB 12.2 13.1 13.8     Recent Labs   Lab Test 07/22/20  1017 03/12/20  1115 04/23/18  1729   HCT 37.0 38.4 43.1       Please verify a HGB or HCT has been checked SINCE THE LAST DOSE CHANGE.            Passed - Medication is active on med list           Last Written Prescription Date: 08/14/20  Last Fill Quantity: 30,  # refills: 1   Last office visit: Visit date not found with prescribing provider:  Ebonie Woods   Future Office Visit:   Next 5 appointments (look out 90 days)    Sep 18, 2020  2:50 PM CDT  ESTABLISHED PRENATAL with KAILYN Collazo CNM  [facility] [place] ([facility]) [address] [id number] [phone]         Requested Prescriptions   Pending Prescriptions Disp Refills     ferrous sulfate (FEROSUL) 325 (65 Fe) MG tablet 30 tablet 1     Sig: Take 1 tablet (325 mg) by mouth daily (with breakfast)       Iron Supplements Passed - 9/17/2020  2:47 PM        Passed - Patient is 12 years of age or older    " "    Passed - Recent (12 mo) or future (30 days) visit within the authorizing provider's specialty     Patient has had an office visit with the authorizing provider or a provider within the authorizing providers department within the previous 12 mos or has a future within next 30 days. See \"Patient Info\" tab in inbasket, or \"Choose Columns\" in Meds & Orders section of the refill encounter.              Passed - Hgb OR Hct on record within the past 12 mos.     Patient need only have had a HGB or HCT on file in the past 12 mos. That result does not need to be normal.    Recent Labs   Lab Test 07/22/20  1017 03/12/20  1115 04/23/18  1729   HGB 12.2 13.1 13.8     Recent Labs   Lab Test 07/22/20  1017 03/12/20  1115 04/23/18  1729   HCT 37.0 38.4 43.1       Please verify a HGB or HCT has been checked SINCE THE LAST DOSE CHANGE.            Passed - Medication is active on med list           vitamin C (ASCORBIC ACID) 250 MG tablet 30 tablet 1     Sig: Take 1 tablet (250 mg) by mouth daily       There is no refill protocol information for this order        Last Written Prescription Date: 08/14/20  Last Fill Quantity: 30,  # refills: 1   Last office visit: Visit date not found with prescribing provider:  Ebonie Woods   Future Office Visit:   Next 5 appointments (look out 90 days)    Sep 18, 2020  2:50 PM CDT  ESTABLISHED PRENATAL with KAILYN Collazo CNM  [facility] [place] ([facility]) [address] [place] [id number]  [phone]         Prescription approved per [facility] Refill Protocol.  Coni Krueger RN on 9/17/2020 at 2:52 PM            "

## 2020-09-18 ENCOUNTER — PRENATAL OFFICE VISIT (OUTPATIENT)
Dept: MIDWIFE SERVICES | Facility: CLINIC | Age: 35
End: 2020-09-18
Payer: COMMERCIAL

## 2020-09-18 VITALS — BODY MASS INDEX: 36.16 KG/M2 | WEIGHT: 252 LBS | SYSTOLIC BLOOD PRESSURE: 122 MMHG | DIASTOLIC BLOOD PRESSURE: 72 MMHG

## 2020-09-18 DIAGNOSIS — O99.212 OBESITY AFFECTING PREGNANCY IN SECOND TRIMESTER: ICD-10-CM

## 2020-09-18 DIAGNOSIS — Z34.03 ENCOUNTER FOR SUPERVISION OF NORMAL FIRST PREGNANCY IN THIRD TRIMESTER: ICD-10-CM

## 2020-09-18 PROCEDURE — 99207 ZZC PRENATAL VISIT: CPT | Performed by: ADVANCED PRACTICE MIDWIFE

## 2020-09-18 NOTE — PATIENT INSTRUCTIONS
"Labor Instructions for Midwife Patients    When to call:  Both during and after office hours call  [phone]. There is a triage RN to take your calls and answer your questions 24 hours a day.  If she cannot answer your question she will page the midwife on call for you.    When to call:  Call anytime you have important concerns about you or your baby.     Call if:    You are having contractions at regular intervals about 5-6 minutes apart lasting 30-60 seconds and becoming increasingly more intense     You have an uncontrollable gush of fluid from your vagina or feel a pop and gush like your water has broken    You have HEAVY bleeding, like heavy period, blood running down your legs, or  soaking a pad.     Some bleeding after a pelvic exam, after intercourse, or in labor when your cervix is dilating is normal and is referred to as \"bloody show\"    You have severe, continuous back or abdominal pain    You feel it is time to go to the hospital    If this is your first labor, call when contractions are very intense and have been about every 3-4 minutes for about an hour    If it is your second labor or more, call when contractions are strong and about every 3-5 minutes or sooner depending on your level of discomfort.     Keep in mind we are always here for you! If you have questions, concerns please don't hesitate to call us.     What to eat/drink in labor: Drink plenty of fluid (water most importantly, juice, soda or tea without caffeine). Eat rice, pasta, soup, cereal, bread/toast, and fruit. Avoid dairy and greasy food as they are difficult to digest and you may experience some nausea during labor.    Comfort measures:    Baths and showers (ok even with ruptured membranes, it may temporarily slow contractions if you are still in the early stage of labor)    Warm/hot packs for back pain or discomfort    Back, belly, or thigh massages    Standing, rocking, walking, leaning over bed or tables, side-lying and " sleeping    Miscellaneous:     Contractions are timed from the beginning of one to the beginning of the next    Try hard to sleep during the early stage of labor when you are not that uncomfortable. Timing of contractions at this point is not important    Even if you cannot sleep, resting in bed or on the couch can help you maintain your energy for labor    When you arrive at the hospital the nurse will check your baby's heartbeat, check your cervix, and will call us. The midwife on call will come in and be with you when you are in active labor    After hours you need to enter the hospital through the emergency room        NATURAL LABOR PREPARATION    So, you're getting closer and closer to your due date and wondering what you can do to help get your body ready for labor.   Here are some natural methods you can try:    NOTE: DO NOT begin any of the following prior to 36 completed weeks of pregnancy!    Evening Primrose Oil: Take 1000mg by mouth three times per day. This encourages the cervix to ripen. Cervical ripening is when your cervix becomes more soft and stretchy to get ready for dilation and effacement.     Red Raspberry Leaf Tea: Red raspberry tea (get it at a Co-op or in the grocery store). Drink three cups per day (hot or cold). This can help to prepare the uterine muscles for labor.      Fetal Kick Counts    It is important to know when your baby's movements occur. We often get busy with work and life and do not pay close attention to their movements.        Women typically begin feeling movement between 18-22 weeks of gestation, sometimes it can be earlier or later depending on where your placenta is       Movements usually begin feeling like popping or fluttering and as the baby grows they become more pronounced    Toward the end of pregnancy as the baby gets larger they may not move as much or make as big of movements. Babies have maturing sleep cycles as well as not as much room to move and flip. If  "you are ever concerned about your baby's movements or have not felt the baby move for a while, we recommend you do a fetal kick count. Prior to starting your count drink a glass of water or juice and eat a snack. Then lay down on your side and begin to count movements.       PREECLAMPSIA SIGNS AND SYMPTOMS    Preeclampsia is a dangerous condition that some women develop in the second half of pregnancy. It can also begin after the baby is born.  Preeclampsia causes high blood pressure and can cause problems with many organ systems in your body.  It can also affect the growth of your baby. The exact cause of preeclampsia is unknown, however, there are signs and symptoms to watch for:    -A bad headache that doesn't improve with Tylenol  -Visual changes such as spots, flashes of light, blurry vision  -Pain in the upper right part of your abdomen, especially under the ribs that doesn't go away  -Nausea and/or vomiting  -Feeling extremely tired  -Yellowing of the skin and/or eyes  -Feeling \"not quite right\" or that something is wrong  -An extreme amount of swelling (some swelling in pregnancy is very normal)    If your midwife feels that you are developing preeclampsia, you will have lab tests drawn and will be monitored very closely.     If you are experiencing anyof these symptoms, call the [facility] immediately at [phone].      "

## 2020-09-18 NOTE — PROGRESS NOTES
Feels well  Fetal movement: positive  Denies vaginal bleeding/lof, a little contractions  Warning signs reviewed  Labor signs and symptoms discussed, aware of numbers to call  Denies s/sx of preeclampsia and aware of what to report  Plans for birth control after baby: Unsure    Return to clinic 1 week    Mikki AVINA CNM

## 2020-09-21 DIAGNOSIS — O99.013 ANEMIA AFFECTING PREGNANCY IN THIRD TRIMESTER: ICD-10-CM

## 2020-09-21 RX ORDER — LANOLIN ALCOHOL/MO/W.PET/CERES
1000 CREAM (GRAM) TOPICAL DAILY
Qty: 30 TABLET | Refills: 1 | OUTPATIENT
Start: 2020-09-21

## 2020-09-21 NOTE — TELEPHONE ENCOUNTER
"Requested Prescriptions   Pending Prescriptions Disp Refills     cyanocobalamin (VITAMIN B-12) 1000 MCG tablet 30 tablet 1     Sig: Take 1 tablet (1,000 mcg) by mouth daily       Vitamin Supplements (Adult) Protocol Passed - 9/21/2020  1:19 PM        Passed - High dose Vitamin D not ordered        Passed - Recent (12 mo) or future (30 days) visit within the authorizing provider's specialty     Patient has had an office visit with the authorizing provider or a provider within the authorizing providers department within the previous 12 mos or has a future within next 30 days. See \"Patient Info\" tab in inbasket, or \"Choose Columns\" in Meds & Orders section of the refill encounter.              Passed - Medication is active on med list           Last Written Prescription Date:  8/14/20  Last Fill Quantity: 30,  # refills: 1   Last office visit with provider:  Mikki Zepeda   Future Office Visit:   Next 5 appointments (look out 90 days)    Sep 24, 2020  3:40 PM CDT  ESTABLISHED PRENATAL with KAILYN Solano CNM  [facility] [place] ([facility]) [address] [address] [phone]                 "

## 2020-09-21 NOTE — TELEPHONE ENCOUNTER
Refill request refused due to :   Too soon for refill.  Zeina Christensen RN on 9/21/2020 at 1:45 PM

## 2020-09-23 PROBLEM — O09.93 SUPERVISION OF HIGH RISK PREGNANCY IN THIRD TRIMESTER: Status: ACTIVE | Noted: 2020-03-12

## 2020-09-23 NOTE — PROGRESS NOTES
Feels well; a little sore but getting a massage for her birthday tomorrow. Ready to be done, hoping baby comes soon.   Plans epidural  After using tub  Fetal movement: positive  Denies vaginal bleeding/lof, no contractions  Warning signs reviewed   Labor signs and symptoms discussed, aware of numbers to call  Denies s/sx of pre-eclampsia and aware of what to report; BP normal today  Return to clinic 1 week    KAILYN Sarabia, CNM

## 2020-09-24 ENCOUNTER — PRENATAL OFFICE VISIT (OUTPATIENT)
Dept: MIDWIFE SERVICES | Facility: CLINIC | Age: 35
End: 2020-09-24
Payer: COMMERCIAL

## 2020-09-24 VITALS — SYSTOLIC BLOOD PRESSURE: 136 MMHG | DIASTOLIC BLOOD PRESSURE: 68 MMHG | BODY MASS INDEX: 36.45 KG/M2 | WEIGHT: 254 LBS

## 2020-09-24 DIAGNOSIS — O09.93 SUPERVISION OF HIGH RISK PREGNANCY IN THIRD TRIMESTER: Primary | ICD-10-CM

## 2020-09-24 DIAGNOSIS — O99.213 OBESITY AFFECTING PREGNANCY IN THIRD TRIMESTER: ICD-10-CM

## 2020-09-24 DIAGNOSIS — Z3A.38 38 WEEKS GESTATION OF PREGNANCY: ICD-10-CM

## 2020-09-24 PROCEDURE — 99207 ZZC PRENATAL VISIT: CPT | Performed by: ADVANCED PRACTICE MIDWIFE

## 2020-09-30 PROBLEM — O99.213 OBESITY AFFECTING PREGNANCY IN THIRD TRIMESTER: Status: ACTIVE | Noted: 2020-03-12

## 2020-09-30 NOTE — PROGRESS NOTES
Mireya feels well, has been getting regular massage for SI discomfort and states it is effective. Dependent edema, using compression stockings, worse on busy days when she is on her feet a lot. Edema typically worse on R side.   Fetal movement: positive  Denies vaginal bleeding/lof, no contractions  Warning signs reviewed.  Labor signs and symptoms discussed, birth plan reviewed. Has  (Daisy) and plans to use hydrotherapy and nitrous while laboring if possible. Plans epidural for second stage.   Denies s/sx of preeclampsia and aware of what to report.  Would like vitamin K IM, erythromycin and hep B vaccination for baby Keon. May use [facility] for pediatrician if possible, plans to call clinic.    Gave information on how to pre-register with the hospital, s/sx of pre-eclampsia, when to call in labor, and fetal kick counts.    Return to clinic 1 week      MELISSA Us  [facility]    I, Daisy Ames, am serving as a scribe; to document services personally performed by Ebonie AVINA CNM- based on data collection and the provider's statements to me.    Provider Disclosure:  I agree with above History, Review of Systems, Physical exam and Plan. I have reviewed the content of the documentation and have edited it as needed. I have personally performed the services documented here and the documentation accurately represents those services and the decisions I have made.    Ebonie AVINA CNM

## 2020-10-01 ENCOUNTER — PRENATAL OFFICE VISIT (OUTPATIENT)
Dept: MIDWIFE SERVICES | Facility: CLINIC | Age: 35
End: 2020-10-01
Payer: COMMERCIAL

## 2020-10-01 VITALS — BODY MASS INDEX: 36.65 KG/M2 | WEIGHT: 255.4 LBS | DIASTOLIC BLOOD PRESSURE: 70 MMHG | SYSTOLIC BLOOD PRESSURE: 124 MMHG

## 2020-10-01 DIAGNOSIS — O09.93 SUPERVISION OF HIGH RISK PREGNANCY IN THIRD TRIMESTER: ICD-10-CM

## 2020-10-01 DIAGNOSIS — O99.213 OBESITY AFFECTING PREGNANCY IN THIRD TRIMESTER: Primary | ICD-10-CM

## 2020-10-01 DIAGNOSIS — Z23 NEED FOR PROPHYLACTIC VACCINATION AND INOCULATION AGAINST INFLUENZA: ICD-10-CM

## 2020-10-01 DIAGNOSIS — Z3A.39 39 WEEKS GESTATION OF PREGNANCY: ICD-10-CM

## 2020-10-01 PROCEDURE — 99207 PR PRENATAL VISIT: CPT | Performed by: NURSE PRACTITIONER

## 2020-10-01 PROCEDURE — 90686 IIV4 VACC NO PRSV 0.5 ML IM: CPT | Performed by: NURSE PRACTITIONER

## 2020-10-01 NOTE — PATIENT INSTRUCTIONS
"PREECLAMPSIA SIGNS AND SYMPTOMS    Preeclampsia is a dangerous condition that some women develop in the second half of pregnancy. It can also begin after the baby is born.  Preeclampsia causes high blood pressure and can cause problems with many organ systems in your body.  It can also affect the growth of your baby. The exact cause of preeclampsia is unknown, however, there are signs and symptoms to watch for:    -A bad headache that doesn't improve with Tylenol  -Visual changes such as spots, flashes of light, blurry vision  -Pain in the upper right part of your abdomen, especially under the ribs that doesn't go away  -Nausea and/or vomiting  -Feeling extremely tired  -Yellowing of the skin and/or eyes  -Feeling \"not quite right\" or that something is wrong  -An extreme amount of swelling (some swelling in pregnancy is very normal)    If your midwife feels that you are developing preeclampsia, you will have lab tests drawn and will be monitored very closely.     If you are experiencing anyof these symptoms, call the [facility] immediately at [phone].  Labor Instructions for Midwife Patients    When to call:  Both during and after office hours call  [phone]. There is a triage RN to take your calls and answer your questions 24 hours a day.  If they cannot answer your question they will page the midwife on call for you.    When to call:  Call anytime you have important concerns about you or your baby.     Call if:    You are having contractions at regular intervals about 5-6 minutes apart lasting 30-60 seconds and becoming increasingly more intense     You have an uncontrollable gush of fluid from your vagina or feel a pop and gush like your water has broken    You have HEAVY bleeding, like heavy period, blood running down your legs, or  soaking a pad.     Some bleeding after a pelvic exam, after intercourse, or in labor when your cervix is dilating is normal and is referred to as " "\"bloody show\"    You have severe, continuous back or abdominal pain    You feel it is time to go to the hospital    If this is your first labor, call when contractions are very intense and have been about every 3-4 minutes for about an hour    If it is your second labor or more, call when contractions are strong and about every 3-5 minutes or sooner depending on your level of discomfort.     Keep in mind we are always here for you! If you have questions, concerns please don't hesitate to call us.     What to eat/drink in labor: Drink plenty of fluid (water most importantly, juice, soda or tea without caffeine). Eat rice, pasta, soup, cereal, bread/toast, and fruit. Avoid dairy and greasy food as they are difficult to digest and you may experience some nausea during labor.    Comfort measures:    Baths and showers (ok even with ruptured membranes, it may temporarily slow contractions if you are still in the early stage of labor)    Warm/hot packs for back pain or discomfort    Back, belly, or thigh massages    Standing, rocking, walking, leaning over bed or tables, side-lying and sleeping    Miscellaneous:     Contractions are timed from the beginning of one to the beginning of the next    Try hard to sleep during the early stage of labor when you are not that uncomfortable. Timing of contractions at this point is not important    Even if you cannot sleep, resting in bed or on the couch can help you maintain your energy for labor    When you arrive at the hospital the nurse will check your baby's heartbeat, check your cervix, and will call us. The midwife on call will come in and be with you when you are in active labor    After hours you need to enter the hospital through the emergency room  Fetal Kick Counts    It is important to know when your baby's movements occur. We often get busy with work and life and do not pay close attention to their movements.        Women typically begin feeling movement between 18-22 " weeks of gestation, sometimes it can be earlier or later depending on where your placenta is       Movements usually begin feeling like popping or fluttering and as the baby grows they become more pronounced    Toward the end of pregnancy as the baby gets larger they may not move as much or make as big of movements. Babies have maturing sleep cycles as well as not as much room to move and flip. If you are ever concerned about your baby's movements or have not felt the baby move for a while, we recommend you do a fetal kick count. Prior to starting your count drink a glass of water or juice and eat a snack. Then lay down on your side and begin to count movements.     How to do a Fetal Kick Counts    There are many different ways to monitor your baby's movements. Movements can range from large jabs to small kicks, or wiggles.  Hiccups count!      Count 10 movements in 2 hours when resting and focusing    Count 10 movements in 12 hours when doing normal activity    We recommend that if movements occur but seem decreased that you should be seen in the clinic or hospital for evaluation within 12 hours. If fetal movement is absent or fetal kick counts are low please contact us right away.    If you ever have any concerns about your baby's movements DO NOT HESITATE to call us, we are here for you!    [employer]  [phone]

## 2020-10-02 DIAGNOSIS — O99.013 ANEMIA AFFECTING PREGNANCY IN THIRD TRIMESTER: ICD-10-CM

## 2020-10-02 RX ORDER — FERROUS SULFATE 325(65) MG
325 TABLET ORAL
Qty: 30 TABLET | Refills: 1 | Status: SHIPPED | OUTPATIENT
Start: 2020-10-02 | End: 2020-10-06

## 2020-10-02 RX ORDER — MULTIVIT WITH MINERALS/LUTEIN
250 TABLET ORAL DAILY
Qty: 30 TABLET | Refills: 1 | Status: SHIPPED | OUTPATIENT
Start: 2020-10-02 | End: 2020-10-06

## 2020-10-02 NOTE — TELEPHONE ENCOUNTER
Prescription approved per [facility] Refill Protocol.  Cecille Bustamante RN on 10/2/2020 at 2:15 PM

## 2020-10-02 NOTE — TELEPHONE ENCOUNTER
Prescription approved per [facility] Refill Protocol.  Cecille Bustamante RN on 10/2/2020 at 2:53 PM

## 2020-10-02 NOTE — TELEPHONE ENCOUNTER
Requested Prescriptions   Pending Prescriptions Disp Refills     vitamin C (ASCORBIC ACID) 250 MG tablet 30 tablet 1     Sig: Take 1 tablet (250 mg) by mouth daily       There is no refill protocol information for this order        Last Written Prescription Date:  9/17/20  Last Fill Quantity: 30,  # refills: 1   Last office visit:10/1/20:  Ebonie Woods   Future Office Visit:   Next 5 appointments (look out 90 days)    Oct 08, 2020  1:00 PM  [facility] [place] ([facility]) [address] [address] [phone]

## 2020-10-02 NOTE — TELEPHONE ENCOUNTER
"Requested Prescriptions   Pending Prescriptions Disp Refills     ferrous sulfate (FEROSUL) 325 (65 Fe) MG tablet 30 tablet 1     Sig: Take 1 tablet (325 mg) by mouth daily (with breakfast)       Iron Supplements Passed - 10/2/2020 12:44 PM        Passed - Patient is 12 years of age or older        Passed - Recent (12 mo) or future (30 days) visit within the authorizing provider's specialty     Patient has had an office visit with the authorizing provider or a provider within the authorizing providers department within the previous 12 mos or has a future within next 30 days. See \"Patient Info\" tab in inbasket, or \"Choose Columns\" in Meds & Orders section of the refill encounter.              Passed - Hgb OR Hct on record within the past 12 mos.     Patient need only have had a HGB or HCT on file in the past 12 mos. That result does not need to be normal.    Recent Labs   Lab Test 07/22/20  1017 03/12/20  1115 04/23/18  1729   HGB 12.2 13.1 13.8     Recent Labs   Lab Test 07/22/20  1017 03/12/20  1115 04/23/18  1729   HCT 37.0 38.4 43.1       Please verify a HGB or HCT has been checked SINCE THE LAST DOSE CHANGE.            Passed - Medication is active on med list           Last Written Prescription Date:  9/17/20  Last Fill Quantity: 30,  # refills: 1   Last office visit: Visit date not found with prescribing provider:  Ebonie Woods   Future Office Visit:   Next 5 appointments (look out 90 days)    Oct 08, 2020  1:00 PM  [facility] [place] ([facility] [place]) [address] [address] [phone]                 "

## 2020-10-06 DIAGNOSIS — O99.013 ANEMIA AFFECTING PREGNANCY IN THIRD TRIMESTER: ICD-10-CM

## 2020-10-06 RX ORDER — FERROUS SULFATE 325(65) MG
325 TABLET ORAL
Qty: 90 TABLET | Refills: 0 | Status: SHIPPED | OUTPATIENT
Start: 2020-10-06 | End: 2021-06-23

## 2020-10-06 RX ORDER — MULTIVIT WITH MINERALS/LUTEIN
250 TABLET ORAL DAILY
Qty: 90 TABLET | Refills: 0 | Status: SHIPPED | OUTPATIENT
Start: 2020-10-06 | End: 2021-06-23

## 2020-10-06 NOTE — TELEPHONE ENCOUNTER
Prescription approved per [facility] Refill Protocol.  90 day supply given.  Zeina Christensen RN on 10/6/2020 at 10:50 AM

## 2020-10-06 NOTE — TELEPHONE ENCOUNTER
Requested Prescriptions   Pending Prescriptions Disp Refills     vitamin C (ASCORBIC ACID) 250 MG tablet 30 tablet 1     Sig: Take 1 tablet (250 mg) by mouth daily       There is no refill protocol information for this order        Last Written Prescription Date:  10/02/2020  Last Fill Quantity: 30,  # refills: 1   Last office visit: Visit date not found with prescribing provider:  Ebnoie Woods   Future Office Visit:   Next 5 appointments (look out 90 days)    Oct 08, 2020  1:00 PM  [facility] [address] [place] [address]  [phone]       Pharmacy is requesting a 90 day supply.  Vivi Cooper MA on 10/6/2020 at 11:18 AM

## 2020-10-06 NOTE — TELEPHONE ENCOUNTER
Prescription approved per [facility] Refill Protocol.    Krystal Santos RN on 10/6/2020 at 11:32 AM

## 2020-10-06 NOTE — TELEPHONE ENCOUNTER
"Requested Prescriptions   Pending Prescriptions Disp Refills     ferrous sulfate (FEROSUL) 325 (65 Fe) MG tablet 30 tablet 1     Sig: Take 1 tablet (325 mg) by mouth daily (with breakfast)       Iron Supplements Passed - 10/6/2020 10:45 AM        Passed - Patient is 12 years of age or older        Passed - Recent (12 mo) or future (30 days) visit within the authorizing provider's specialty     Patient has had an office visit with the authorizing provider or a provider within the authorizing providers department within the previous 12 mos or has a future within next 30 days. See \"Patient Info\" tab in inbasket, or \"Choose Columns\" in Meds & Orders section of the refill encounter.              Passed - Hgb OR Hct on record within the past 12 mos.     Patient need only have had a HGB or HCT on file in the past 12 mos. That result does not need to be normal.    Recent Labs   Lab Test 07/22/20  1017 03/12/20  1115 04/23/18  1729   HGB 12.2 13.1 13.8     Recent Labs   Lab Test 07/22/20  1017 03/12/20  1115 04/23/18  1729   HCT 37.0 38.4 43.1       Please verify a HGB or HCT has been checked SINCE THE LAST DOSE CHANGE.            Passed - Medication is active on med list           Last Written Prescription Date:  10/02/2020  Last Fill Quantity: 30,  # refills: 1   Last office visit: Visit date not found with prescribing provider:  Ebonie Woods   Future Office Visit:   Next 5 appointments (look out 90 days)    Oct 08, 2020  1:00 PM  ESTABLISHED PRENATAL with KAILYN Collazo [facility] [place] ([facility]) [address] [address]  [phone]           Pharmacy is requesting a 90 day supply.  Vivi Cooper MA on 10/6/2020 at 10:47 AM      "

## 2020-10-07 NOTE — PROGRESS NOTES
Feels well  Fetal movement: positive  Denies vaginal bleeding/lof, no contractions  SVE: Posterior, unable to reach cervix.   Warning signs reviewed   Labor signs and symptoms discussed  Denies s/sx of pre-eclampsia and aware of what to report.  Discussed post dates management, order for BPP at 41 weeks, discussed recommendation for induction of labor after 41 weeks  Briefly discussed IOL process    Return to clinic 1 week    Mikki AVINA CNM

## 2020-10-08 ENCOUNTER — PRENATAL OFFICE VISIT (OUTPATIENT)
Dept: MIDWIFE SERVICES | Facility: CLINIC | Age: 35
End: 2020-10-08
Payer: COMMERCIAL

## 2020-10-08 VITALS — SYSTOLIC BLOOD PRESSURE: 126 MMHG | WEIGHT: 258 LBS | BODY MASS INDEX: 37.02 KG/M2 | DIASTOLIC BLOOD PRESSURE: 84 MMHG

## 2020-10-08 DIAGNOSIS — O99.213 OBESITY AFFECTING PREGNANCY IN THIRD TRIMESTER: ICD-10-CM

## 2020-10-08 DIAGNOSIS — O09.93 SUPERVISION OF HIGH RISK PREGNANCY IN THIRD TRIMESTER: ICD-10-CM

## 2020-10-08 PROCEDURE — 99207 PR PRENATAL VISIT: CPT | Performed by: ADVANCED PRACTICE MIDWIFE

## 2020-10-08 NOTE — PATIENT INSTRUCTIONS
Post Dates Management    If you've gone beyond your due date you are probably thinking when is this baby coming!  Most babies are born on or after their due date so it is nothing to worry about.    If you are pregnant at 41 weeks we will start some increased monitoring to make sure that your baby and the placenta are healthy enough to continue your pregnancy.      We would have you come to the clinic every 3-4 days to be assessed with an ultrasound called a Biophysical profile (BPP).       This tells us how your baby is doing. We monitor fetal movement, breathing patterns, amniotic fluid level, and heart rate.     Research has shown that by 42 weeks gestation the placenta is not always healthy enough to support the pregnancy further and it is better for the baby to be born.  If you are still pregnant by 41 and a half weeks we will discuss induction of labor with you and the different options available.     Preconception Care    If you are thinking about becoming pregnant in the near future or actively trying to conceive we want to make sure you are as healthy as possible before becoming pregnant. By meeting with your midwife or provider prior to getting pregnant it allows us to address any health needs that can affect pregnancy. Please discuss your personal and family history with your midwife in order for us to identify any risk factors      If you wish to attempt pregnancy stop whichever form of contraception you use. If you have an IUD it can be removed in the office and you can start trying right away. If you take OCPs finish current pack, cycle, and then you can actively start trying      Make sure all the medications you are taking are safe for pregnancy. This is especially important for women with chronic health conditions such as diabetes, seizure disorders, and/or hypertension. If you are unsure if your medications are safe we can discuss them with you, do not abruptly stop taking something if  you've been prescribed a medication by a medical provider      Folic acid 400-800 mcg per day by mouth daily, begin this routine 1 to 12 months prior to planned conception, and continue through at least 13 weeks gestation. Some prenatal/multi-vitamins contain enough folic acid       Be up to date on all your vaccines. Avoid pregnancy for 1 month after administration of varicella/ Rubella (MMR) vaccines      We encourage all women to be at healthy BMI (<26) when attempting pregnancy, it is healthier for both you and your baby. If you are overweight you can make a healthy choice by eating better and exercising more. Waiting to get pregnant at a healthy weight is an excellent choice.                                       Eat a healthy, well-balanced diet containing lots of fresh fruit and vegetables (frozen without added sugar is okay), protein, and healthy carbohydrates such as whole wheat breads and pastas      Exercise regularly. If you are wishing to get pregnant maintain normal exercise routine. If you don't exercise this is a great time for light activity daily such as walking/swimming      Limit caffeine intake to less than 200 mg per day, typically 1-2 cups of regular coffee is about 200 mg.       Avoid cigarettes, alcohol, and recreational drug use while attempting pregnancy. If you are actively trying to conceive but you get your period or a negative pregnancy test it is okay to have alcohol in moderation until you are actively trying again      If you have the potential to toxic chemical exposures in your work place or home please use special precautions    Menstrual Cycles      Knowing your cycle is incredibly important when attempting pregnancy      Your cycle begins day 1 of your period and goes until the 1st day of your next period. Typically women have 28-32 day cycles. If your cycles are shorter or longer it can be a normal variation.       Women typically ovulate in the middle of their  "cycle      There are many great apps that can help you track you cycle monthly to establish when you are ovulating      You may also start taking your temperature daily with a basal body temp thermometer to help identify when you ovulate      There are also ovulation predictor kits available over the counter at the pharmacy      If you want more information please contact your midwife      It can take up to 1 year for a woman under the age of 35 or 6 months for a woman over the age of 35 to conceive. If it takes longer than this we would like to evaluate you for fertility issues.       PREECLAMPSIA SIGNS AND SYMPTOMS    Preeclampsia is a dangerous condition that some women develop in the second half of pregnancy. It can also begin after the baby is born.  Preeclampsia causes high blood pressure and can cause problems with many organ systems in your body.  It can also affect the growth of your baby. The exact cause of preeclampsia is unknown, however, there are signs and symptoms to watch for:    -A bad headache that doesn't improve with Tylenol  -Visual changes such as spots, flashes of light, blurry vision  -Pain in the upper right part of your abdomen, especially under the ribs that doesn't go away  -Nausea and/or vomiting  -Feeling extremely tired  -Yellowing of the skin and/or eyes  -Feeling \"not quite right\" or that something is wrong  -An extreme amount of swelling (some swelling in pregnancy is very normal)    If your midwife feels that you are developing preeclampsia, you will have lab tests drawn and will be monitored very closely.     If you are experiencing anyof these symptoms, call the [facility] immediately at [phone].    "

## 2020-10-14 NOTE — PROGRESS NOTES
Feels well overall, ready for baby to be here. Would like to give it the weekend to see if baby comes on his own otherwise is interested in IOL Sunday night, will be 41w4d at that time.  Fetal movement: positive    BPP:  8/8,  QUINTIN:  12.26cm, MVP 4.09cm, Cephalic,  bpm  Denies vaginal bleeding/lof, a little contractions  Warning signs reviewed   Labor signs and symptoms discussed, aware of numbers to call  Denies s/sx of preeclampsia and aware of what to report  Induction of labor discussed and recommended between 41-42 weeks  Induction of labor scheduled:  Yes  Date/Time for induction of labor:  10/18/20 @ 1930     Flores Score:  Dilation of Cervix:  0        Score = 0  Effacement:  40-50%;   Score = 1  Consistency:  Average;   Score = 1  Position:  Posterior;   Score = 0  Station:  -3;  Score = 0  Total Flores Score:  2    Cervical ripening needed:  Yes. Discussed various methods of cervical ripening, will likely do Cervidil but I will review options again on admission   Discussed possible methods cervical ripening, risks/benefits and expectations.  Covid testing ordered   IOL information and map given, reminded to call L&D 1 hr prior to confirm IOL.     KAILYN Box, CNM

## 2020-10-14 NOTE — PATIENT INSTRUCTIONS
Post Dates Management    If you've gone beyond your due date you are probably thinking when is this baby coming!  Most babies are born on or after their due date so it is nothing to worry about.    If you are pregnant at 41 weeks we will start some increased monitoring to make sure that your baby and the placenta are healthy enough to continue your pregnancy.      We would have you come to the clinic every 3-4 days to be assessed with an ultrasound called a Biophysical profile (BPP).       This tells us how your baby is doing. We monitor fetal movement, breathing patterns, amniotic fluid level, and heart rate.     Research has shown that by 42 weeks gestation the placenta is not always healthy enough to support the pregnancy further and it is better for the baby to be born.  If you are still pregnant by 41 and a half weeks we will discuss induction of labor with you and the different options available.     Fetal Kick Counts    It is important to know when your baby's movements occur. We often get busy with work and life and do not pay close attention to their movements.        Women typically begin feeling movement between 18-22 weeks of gestation, sometimes it can be earlier or later depending on where your placenta is       Movements usually begin feeling like popping or fluttering and as the baby grows they become more pronounced    Toward the end of pregnancy as the baby gets larger they may not move as much or make as big of movements. Babies have maturing sleep cycles as well as not as much room to move and flip. If you are ever concerned about your baby's movements or have not felt the baby move for a while, we recommend you do a fetal kick count. Prior to starting your count drink a glass of water or juice and eat a snack. Then lay down on your side and begin to count movements.     How to do a Fetal Kick Counts    There are many different ways to monitor your baby's movements. Movements can range from  "large jabs to small kicks, or wiggles.  Hiccups count!      Count 10 movements in 2 hours when resting and focusing    Count 10 movements in 12 hours when doing normal activity    We recommend that if movements occur but seem decreased that you should be seen in the clinic or hospital for evaluation within 12 hours. If fetal movement is absent or fetal kick counts are low please contact us right away.    If you ever have any concerns about your baby's movements DO NOT HESITATE to call us, we are here for you!    [facility]  [phone]      PREECLAMPSIA SIGNS AND SYMPTOMS    Preeclampsia is a dangerous condition that some women develop in the second half of pregnancy. It can also begin after the baby is born.  Preeclampsia causes high blood pressure and can cause problems with many organ systems in your body.  It can also affect the growth of your baby. The exact cause of preeclampsia is unknown, however, there are signs and symptoms to watch for:    -A bad headache that doesn't improve with Tylenol  -Visual changes such as spots, flashes of light, blurry vision  -Pain in the upper right part of your abdomen, especially under the ribs that doesn't go away  -Nausea and/or vomiting  -Feeling extremely tired  -Yellowing of the skin and/or eyes  -Feeling \"not quite right\" or that something is wrong  -An extreme amount of swelling (some swelling in pregnancy is very normal)    If your midwife feels that you are developing preeclampsia, you will have lab tests drawn and will be monitored very closely.     If you are experiencing anyof these symptoms, call the [facility] immediately at [phone].    Labor Instructions for Midwife Patients    When to call:  Both during and after office hours call  [phone]. There is a triage RN to take your calls and answer your questions 24 hours a day.  If they cannot answer your question they will page the midwife on call for " "you.    When to call:  Call anytime you have important concerns about you or your baby.     Call if:    You are having contractions at regular intervals about 5-6 minutes apart lasting 30-60 seconds and becoming increasingly more intense     You have an uncontrollable gush of fluid from your vagina or feel a pop and gush like your water has broken    You have HEAVY bleeding, like heavy period, blood running down your legs, or  soaking a pad.     Some bleeding after a pelvic exam, after intercourse, or in labor when your cervix is dilating is normal and is referred to as \"bloody show\"    You have severe, continuous back or abdominal pain    You feel it is time to go to the hospital    If this is your first labor, call when contractions are very intense and have been about every 3-4 minutes for about an hour    If it is your second labor or more, call when contractions are strong and about every 3-5 minutes or sooner depending on your level of discomfort.     Keep in mind we are always here for you! If you have questions, concerns please don't hesitate to call us.     What to eat/drink in labor: Drink plenty of fluid (water most importantly, juice, soda or tea without caffeine). Eat rice, pasta, soup, cereal, bread/toast, and fruit. Avoid dairy and greasy food as they are difficult to digest and you may experience some nausea during labor.    Comfort measures:    Baths and showers (ok even with ruptured membranes, it may temporarily slow contractions if you are still in the early stage of labor)    Warm/hot packs for back pain or discomfort    Back, belly, or thigh massages    Standing, rocking, walking, leaning over bed or tables, side-lying and sleeping    Miscellaneous:     Contractions are timed from the beginning of one to the beginning of the next    Try hard to sleep during the early stage of labor when you are not that uncomfortable. Timing of contractions at this point is not important    Even if you cannot " sleep, resting in bed or on the couch can help you maintain your energy for labor    When you arrive at the hospital the nurse will check your baby's heartbeat, check your cervix, and will call us. The midwife on call will come in and be with you when you are in active labor    After hours you need to enter the hospital through the emergency room

## 2020-10-15 ENCOUNTER — TRANSFERRED RECORDS (OUTPATIENT)
Dept: HEALTH INFORMATION MANAGEMENT | Facility: CLINIC | Age: 35
End: 2020-10-15

## 2020-10-15 ENCOUNTER — PRENATAL OFFICE VISIT (OUTPATIENT)
Dept: MIDWIFE SERVICES | Facility: CLINIC | Age: 35
End: 2020-10-15
Payer: COMMERCIAL

## 2020-10-15 VITALS — BODY MASS INDEX: 37.02 KG/M2 | DIASTOLIC BLOOD PRESSURE: 80 MMHG | WEIGHT: 258 LBS | SYSTOLIC BLOOD PRESSURE: 118 MMHG

## 2020-10-15 DIAGNOSIS — Z01.812 ENCOUNTER FOR PREPROCEDURE SCREENING LABORATORY TESTING FOR COVID-19: ICD-10-CM

## 2020-10-15 DIAGNOSIS — O09.93 SUPERVISION OF HIGH RISK PREGNANCY IN THIRD TRIMESTER: Primary | ICD-10-CM

## 2020-10-15 DIAGNOSIS — O99.213 OBESITY AFFECTING PREGNANCY IN THIRD TRIMESTER: ICD-10-CM

## 2020-10-15 DIAGNOSIS — Z11.52 ENCOUNTER FOR PREPROCEDURE SCREENING LABORATORY TESTING FOR COVID-19: ICD-10-CM

## 2020-10-15 DIAGNOSIS — Z3A.41 41 WEEKS GESTATION OF PREGNANCY: ICD-10-CM

## 2020-10-15 DIAGNOSIS — O48.0 41 WEEKS GESTATION OF PREGNANCY: ICD-10-CM

## 2020-10-15 PROCEDURE — 99207 PR PRENATAL VISIT: CPT | Performed by: ADVANCED PRACTICE MIDWIFE

## 2020-10-16 ENCOUNTER — TELEPHONE (OUTPATIENT)
Dept: MIDWIFE SERVICES | Facility: CLINIC | Age: 35
End: 2020-10-16

## 2020-10-16 DIAGNOSIS — Z01.812 ENCOUNTER FOR PREPROCEDURE SCREENING LABORATORY TESTING FOR COVID-19: ICD-10-CM

## 2020-10-16 DIAGNOSIS — Z11.52 ENCOUNTER FOR PREPROCEDURE SCREENING LABORATORY TESTING FOR COVID-19: ICD-10-CM

## 2020-10-16 DIAGNOSIS — O09.93 SUPERVISION OF HIGH RISK PREGNANCY IN THIRD TRIMESTER: ICD-10-CM

## 2020-10-16 LAB
LABORATORY COMMENT REPORT: NORMAL
SARS-COV-2 RNA SPEC QL NAA+PROBE: NEGATIVE
SARS-COV-2 RNA SPEC QL NAA+PROBE: NORMAL
SPECIMEN SOURCE: NORMAL
SPECIMEN SOURCE: NORMAL

## 2020-10-16 PROCEDURE — U0003 INFECTIOUS AGENT DETECTION BY NUCLEIC ACID (DNA OR RNA); SEVERE ACUTE RESPIRATORY SYNDROME CORONAVIRUS 2 (SARS-COV-2) (CORONAVIRUS DISEASE [COVID-19]), AMPLIFIED PROBE TECHNIQUE, MAKING USE OF HIGH THROUGHPUT TECHNOLOGIES AS DESCRIBED BY CMS-2020-01-R: HCPCS | Performed by: ADVANCED PRACTICE MIDWIFE

## 2020-10-16 NOTE — TELEPHONE ENCOUNTER
Discussed the process of cervical ripening. Expectations with first delivery. Questions answered.   Coni Krueger RN on 10/16/2020 at 1:07 PM

## 2020-10-18 ENCOUNTER — TELEPHONE (OUTPATIENT)
Dept: MIDWIFE SERVICES | Facility: CLINIC | Age: 35
End: 2020-10-18

## 2020-10-18 ENCOUNTER — NURSE TRIAGE (OUTPATIENT)
Dept: NURSING | Facility: CLINIC | Age: 35
End: 2020-10-18

## 2020-10-18 ENCOUNTER — HOSPITAL ENCOUNTER (INPATIENT)
Facility: CLINIC | Age: 35
LOS: 4 days | Discharge: HOME | End: 2020-10-22
Attending: ADVANCED PRACTICE MIDWIFE | Admitting: ADVANCED PRACTICE MIDWIFE
Payer: COMMERCIAL

## 2020-10-18 LAB
ABO + RH BLD: NORMAL
ABO + RH BLD: NORMAL
BASOPHILS # BLD AUTO: 0 10E9/L (ref 0–0.2)
BASOPHILS NFR BLD AUTO: 0.1 %
BLD GP AB SCN SERPL QL: NORMAL
BLOOD BANK CMNT PATIENT-IMP: NORMAL
DIFFERENTIAL METHOD BLD: ABNORMAL
EOSINOPHIL # BLD AUTO: 0.3 10E9/L (ref 0–0.7)
EOSINOPHIL NFR BLD AUTO: 1.9 %
ERYTHROCYTE [DISTWIDTH] IN BLOOD BY AUTOMATED COUNT: 14.2 % (ref 10–15)
HCT VFR BLD AUTO: 39.4 % (ref 35–47)
HGB BLD-MCNC: 13.2 G/DL (ref 11.7–15.7)
IMM GRANULOCYTES # BLD: 0.1 10E9/L (ref 0–0.4)
IMM GRANULOCYTES NFR BLD: 0.8 %
LYMPHOCYTES # BLD AUTO: 1.8 10E9/L (ref 0.8–5.3)
LYMPHOCYTES NFR BLD AUTO: 12.8 %
MCH RBC QN AUTO: 29.7 PG (ref 26.5–33)
MCHC RBC AUTO-ENTMCNC: 33.5 G/DL (ref 31.5–36.5)
MCV RBC AUTO: 89 FL (ref 78–100)
MONOCYTES # BLD AUTO: 0.7 10E9/L (ref 0–1.3)
MONOCYTES NFR BLD AUTO: 5.1 %
NEUTROPHILS # BLD AUTO: 10.9 10E9/L (ref 1.6–8.3)
NEUTROPHILS NFR BLD AUTO: 79.3 %
NRBC # BLD AUTO: 0 10*3/UL
NRBC BLD AUTO-RTO: 0 /100
PLATELET # BLD AUTO: 209 10E9/L (ref 150–450)
RBC # BLD AUTO: 4.45 10E12/L (ref 3.8–5.2)
RUPTURE OF FETAL MEMBRANES BY ROM PLUS: POSITIVE
SPECIMEN EXP DATE BLD: NORMAL
WBC # BLD AUTO: 13.7 10E9/L (ref 4–11)

## 2020-10-18 PROCEDURE — 36415 COLL VENOUS BLD VENIPUNCTURE: CPT | Performed by: ADVANCED PRACTICE MIDWIFE

## 2020-10-18 PROCEDURE — 250N000009 HC RX 250: Performed by: ADVANCED PRACTICE MIDWIFE

## 2020-10-18 PROCEDURE — 84112 EVAL AMNIOTIC FLUID PROTEIN: CPT | Performed by: ADVANCED PRACTICE MIDWIFE

## 2020-10-18 PROCEDURE — 86850 RBC ANTIBODY SCREEN: CPT | Performed by: ADVANCED PRACTICE MIDWIFE

## 2020-10-18 PROCEDURE — 86780 TREPONEMA PALLIDUM: CPT | Performed by: ADVANCED PRACTICE MIDWIFE

## 2020-10-18 PROCEDURE — 258N000003 HC RX IP 258 OP 636: Performed by: ADVANCED PRACTICE MIDWIFE

## 2020-10-18 PROCEDURE — 120N000001 HC R&B MED SURG/OB

## 2020-10-18 PROCEDURE — 86901 BLOOD TYPING SEROLOGIC RH(D): CPT | Performed by: ADVANCED PRACTICE MIDWIFE

## 2020-10-18 PROCEDURE — 59025 FETAL NON-STRESS TEST: CPT | Mod: 26 | Performed by: ADVANCED PRACTICE MIDWIFE

## 2020-10-18 PROCEDURE — 85025 COMPLETE CBC W/AUTO DIFF WBC: CPT | Performed by: ADVANCED PRACTICE MIDWIFE

## 2020-10-18 PROCEDURE — 86900 BLOOD TYPING SEROLOGIC ABO: CPT | Performed by: ADVANCED PRACTICE MIDWIFE

## 2020-10-18 PROCEDURE — 3E033VJ INTRODUCTION OF OTHER HORMONE INTO PERIPHERAL VEIN, PERCUTANEOUS APPROACH: ICD-10-PCS | Performed by: ADVANCED PRACTICE MIDWIFE

## 2020-10-18 RX ORDER — OXYTOCIN/0.9 % SODIUM CHLORIDE 30/500 ML
100-340 PLASTIC BAG, INJECTION (ML) INTRAVENOUS CONTINUOUS PRN
Status: COMPLETED | OUTPATIENT
Start: 2020-10-18 | End: 2020-10-19

## 2020-10-18 RX ORDER — OXYTOCIN/0.9 % SODIUM CHLORIDE 30/500 ML
1-24 PLASTIC BAG, INJECTION (ML) INTRAVENOUS CONTINUOUS
Status: DISCONTINUED | OUTPATIENT
Start: 2020-10-18 | End: 2020-10-19

## 2020-10-18 RX ORDER — NALOXONE HYDROCHLORIDE 0.4 MG/ML
.1-.4 INJECTION, SOLUTION INTRAMUSCULAR; INTRAVENOUS; SUBCUTANEOUS
Status: DISCONTINUED | OUTPATIENT
Start: 2020-10-18 | End: 2020-10-19

## 2020-10-18 RX ORDER — ONDANSETRON 2 MG/ML
4 INJECTION INTRAMUSCULAR; INTRAVENOUS EVERY 6 HOURS PRN
Status: DISCONTINUED | OUTPATIENT
Start: 2020-10-18 | End: 2020-10-19

## 2020-10-18 RX ORDER — CARBOPROST TROMETHAMINE 250 UG/ML
250 INJECTION, SOLUTION INTRAMUSCULAR
Status: DISCONTINUED | OUTPATIENT
Start: 2020-10-18 | End: 2020-10-19

## 2020-10-18 RX ORDER — METHYLERGONOVINE MALEATE 0.2 MG/ML
200 INJECTION INTRAVENOUS
Status: COMPLETED | OUTPATIENT
Start: 2020-10-18 | End: 2020-10-19

## 2020-10-18 RX ORDER — SODIUM CHLORIDE, SODIUM LACTATE, POTASSIUM CHLORIDE, CALCIUM CHLORIDE 600; 310; 30; 20 MG/100ML; MG/100ML; MG/100ML; MG/100ML
INJECTION, SOLUTION INTRAVENOUS CONTINUOUS
Status: DISCONTINUED | OUTPATIENT
Start: 2020-10-18 | End: 2020-10-19

## 2020-10-18 RX ORDER — OXYCODONE AND ACETAMINOPHEN 5; 325 MG/1; MG/1
1 TABLET ORAL
Status: DISCONTINUED | OUTPATIENT
Start: 2020-10-18 | End: 2020-10-19

## 2020-10-18 RX ORDER — ACETAMINOPHEN 325 MG/1
650 TABLET ORAL EVERY 4 HOURS PRN
Status: DISCONTINUED | OUTPATIENT
Start: 2020-10-18 | End: 2020-10-19

## 2020-10-18 RX ORDER — OXYTOCIN 10 [USP'U]/ML
10 INJECTION, SOLUTION INTRAMUSCULAR; INTRAVENOUS
Status: DISCONTINUED | OUTPATIENT
Start: 2020-10-18 | End: 2020-10-19

## 2020-10-18 RX ORDER — TRANEXAMIC ACID 10 MG/ML
1 INJECTION, SOLUTION INTRAVENOUS EVERY 30 MIN PRN
Status: DISCONTINUED | OUTPATIENT
Start: 2020-10-18 | End: 2020-10-19

## 2020-10-18 RX ORDER — FENTANYL CITRATE 50 UG/ML
50-100 INJECTION, SOLUTION INTRAMUSCULAR; INTRAVENOUS
Status: DISCONTINUED | OUTPATIENT
Start: 2020-10-18 | End: 2020-10-19

## 2020-10-18 RX ORDER — LIDOCAINE 40 MG/G
CREAM TOPICAL
Status: DISCONTINUED | OUTPATIENT
Start: 2020-10-18 | End: 2020-10-19

## 2020-10-18 RX ORDER — IBUPROFEN 400 MG/1
800 TABLET, FILM COATED ORAL
Status: DISCONTINUED | OUTPATIENT
Start: 2020-10-18 | End: 2020-10-19

## 2020-10-18 RX ORDER — TERBUTALINE SULFATE 1 MG/ML
0.25 INJECTION, SOLUTION SUBCUTANEOUS
Status: DISCONTINUED | OUTPATIENT
Start: 2020-10-18 | End: 2020-10-19

## 2020-10-18 RX ADMIN — SODIUM CHLORIDE, POTASSIUM CHLORIDE, SODIUM LACTATE AND CALCIUM CHLORIDE 1000 ML: 600; 310; 30; 20 INJECTION, SOLUTION INTRAVENOUS at 23:31

## 2020-10-18 RX ADMIN — Medication 8 MILLI-UNITS/MIN: at 23:30

## 2020-10-18 RX ADMIN — SODIUM CHLORIDE, POTASSIUM CHLORIDE, SODIUM LACTATE AND CALCIUM CHLORIDE: 600; 310; 30; 20 INJECTION, SOLUTION INTRAVENOUS at 17:28

## 2020-10-18 RX ADMIN — Medication 2 MILLI-UNITS/MIN: at 17:28

## 2020-10-18 ASSESSMENT — MIFFLIN-ST. JEOR: SCORE: 1955.45

## 2020-10-18 NOTE — TELEPHONE ENCOUNTER
Call back to Mireya after page by FNA. Reports SROM of clear fluid at 0450. Small trickle noted which continues. Positive fetal movement, denies any contractions, slight pink tinged vaginal mucous, gbs negative.   Recommended staying at home for now and resting. Warning s/sx reviewed and when to call back.   On coming CNM aware of patient status. Mireya made aware CNM will call in a few hours to check in.     Mikki AVINA CNM

## 2020-10-18 NOTE — PLAN OF CARE
1600: patient arrived to the unit from home. She and her  were brought to room 215. With patient permission both uterine and fetal monitors were applied and vital signs taken. Patient reports that she believes her water broke at home this morning at 0445. A ROM+ was performed and found to be positive. Ligia Krishnamurthy CNM is following and pitocin induction orders were placed. Will continue to monitor.

## 2020-10-18 NOTE — TELEPHONE ENCOUNTER
Called to check in with Mireya. Has been resting at home since taking to Mikki this morning. Continues to leak small amount of clear/pink tinged fluid. Positive fetal movement. Denies vaginal bleeding. Not having any contractions. Discussed admission to L&D for labor induction vs monitoring at home for a few more hours. Mireya would really like to wait and see if her body goes into labor on its own. Discussed that is fluid continues to be clear in color, no s/sx of infection, no vaginal bleeding and positive fetal movement, ok to monitor at home for now. If still no contractions by 6-8 hours from time of rom, then advised to present to L&D to evaluation of membrane status and likely admission for induction. Mireya states understanding, will continue to monitor her symptoms. If I do not hear from her by 1200, I will call back and discuss making way to hospital. All questions answered.     Ligia Krishnamurthy, KAILYN, CNM

## 2020-10-18 NOTE — TELEPHONE ENCOUNTER
@ 41wks, 4da    Called to report ROM clear fluid at 0450.  Denies current bleeding or contractions. No discomfort.  Pt unsure of fetal movement.  Instructed to lay down while waiting for the Nurse Midwife to call her and focus on baby movements so she can report this when she talks to the Midwife.    Midwife Mikki Zepeda paged to call pt directly.  Pt will get care advice from the CNM.    Sita Murillo RN                                                                                                                                                                                                                                                                                                                                                                                                                                                                                                                                                                                                                                                                                                                                                                                                                                                                                                                                                                                                                                                                                                                                                                                                                                                                                                                                                                                                                                                                                                                                                                                          "                                                                                                                                                                                                                                                                                                                                                                                                                                                                                                                                                                                                                                                                                                  Reason for Disposition    Leakage of fluid from vagina    Additional Information    Negative: [1] SEVERE abdominal pain (e.g., excruciating) AND [2] constant AND [3] present > 1 hour    Negative: Severe bleeding (e.g., continuous red blood from vagina, or large blood clots)    Negative: Umbilical cord hanging out of the vagina (shiny, white, curled appearance, \"like telephone cord\")    Negative: Uncontrollable urge to push (i.e., feels like baby is coming out now)    Negative: Can see baby    Negative: Sounds like a life-threatening emergency to the triager    Negative: < 20 weeks pregnant    Negative: Vaginal bleeding    Protocols used: PREGNANCY - RUPTURE OF [id number]      "

## 2020-10-18 NOTE — TELEPHONE ENCOUNTER
"Called Mireya to check-in. States things are about the same, \"maybe some irregular cramping.\" Leaking has slowed down to a \"small trickle.\" Still clear in color per Mireya's report. Denies vaginal bleeding. Reports good fetal movement. She denies s/sx of infection including fever, chills, malodorous fluid/discharge. Discussed that it has been ~ 7hrs since Mireya thinks her water broke and because contractions have not started I recommend coming into [facility] for evaluation and likely IOL. Mireya does not want to come in at this moment, she would like to wait until 1500. Discussed rationale for recommending evaluation and IOL is to evaluate for and reduce the risk of infection and prolonged rupture of membranes. She states understanding of information and would like to stay home for now and plans to present to MAC at 1500. Advised that if fluid changes colors, has an odor, she develops s/sx of infection (fever, chills) or has vaginal bleeding she needs to present to hospital ASAP, she is in agreement with this plan. MAC called and notified of patient status and anticipated arrival.     Ligia Krishnamurthy, KAILYN, CNM    "

## 2020-10-19 ENCOUNTER — ANESTHESIA (OUTPATIENT)
Dept: OBGYN | Facility: CLINIC | Age: 35
End: 2020-10-19
Payer: COMMERCIAL

## 2020-10-19 ENCOUNTER — ANESTHESIA EVENT (OUTPATIENT)
Dept: OBGYN | Facility: CLINIC | Age: 35
End: 2020-10-19
Payer: COMMERCIAL

## 2020-10-19 PROBLEM — O42.90 PROLONGED RUPTURE OF MEMBRANES: Status: ACTIVE | Noted: 2020-10-19

## 2020-10-19 LAB
ALBUMIN SERPL-MCNC: 2.2 G/DL (ref 3.4–5)
ALP SERPL-CCNC: 202 U/L (ref 40–150)
ALT SERPL W P-5'-P-CCNC: 11 U/L (ref 0–50)
ANION GAP SERPL CALCULATED.3IONS-SCNC: 8 MMOL/L (ref 3–14)
AST SERPL W P-5'-P-CCNC: 18 U/L (ref 0–45)
BILIRUB SERPL-MCNC: 0.4 MG/DL (ref 0.2–1.3)
BUN SERPL-MCNC: 12 MG/DL (ref 7–30)
CALCIUM SERPL-MCNC: 8.9 MG/DL (ref 8.5–10.1)
CHLORIDE SERPL-SCNC: 107 MMOL/L (ref 94–109)
CO2 SERPL-SCNC: 22 MMOL/L (ref 20–32)
CREAT SERPL-MCNC: 1.08 MG/DL (ref 0.52–1.04)
CREAT UR-MCNC: 127 MG/DL
ERYTHROCYTE [DISTWIDTH] IN BLOOD BY AUTOMATED COUNT: 14.5 % (ref 10–15)
GFR SERPL CREATININE-BSD FRML MDRD: 67 ML/MIN/{1.73_M2}
GLUCOSE SERPL-MCNC: 95 MG/DL (ref 70–99)
HCT VFR BLD AUTO: 37.1 % (ref 35–47)
HGB BLD-MCNC: 12.3 G/DL (ref 11.7–15.7)
MCH RBC QN AUTO: 29.4 PG (ref 26.5–33)
MCHC RBC AUTO-ENTMCNC: 33.2 G/DL (ref 31.5–36.5)
MCV RBC AUTO: 89 FL (ref 78–100)
PLATELET # BLD AUTO: 159 10E9/L (ref 150–450)
POTASSIUM SERPL-SCNC: 3.7 MMOL/L (ref 3.4–5.3)
PROT SERPL-MCNC: 5.5 G/DL (ref 6.8–8.8)
PROT UR-MCNC: 0.5 G/L
PROT/CREAT 24H UR: 0.4 G/G CR (ref 0–0.2)
RBC # BLD AUTO: 4.19 10E12/L (ref 3.8–5.2)
SODIUM SERPL-SCNC: 137 MMOL/L (ref 133–144)
T PALLIDUM AB SER QL: NONREACTIVE
WBC # BLD AUTO: 21.8 10E9/L (ref 4–11)

## 2020-10-19 PROCEDURE — 3E0R3BZ INTRODUCTION OF ANESTHETIC AGENT INTO SPINAL CANAL, PERCUTANEOUS APPROACH: ICD-10-PCS | Performed by: ANESTHESIOLOGY

## 2020-10-19 PROCEDURE — 00HU33Z INSERTION OF INFUSION DEVICE INTO SPINAL CANAL, PERCUTANEOUS APPROACH: ICD-10-PCS | Performed by: ANESTHESIOLOGY

## 2020-10-19 PROCEDURE — 250N000013 HC RX MED GY IP 250 OP 250 PS 637: Performed by: OBSTETRICS & GYNECOLOGY

## 2020-10-19 PROCEDURE — 36415 COLL VENOUS BLD VENIPUNCTURE: CPT | Performed by: OBSTETRICS & GYNECOLOGY

## 2020-10-19 PROCEDURE — 250N000009 HC RX 250: Performed by: NURSE ANESTHETIST, CERTIFIED REGISTERED

## 2020-10-19 PROCEDURE — 85027 COMPLETE CBC AUTOMATED: CPT | Performed by: OBSTETRICS & GYNECOLOGY

## 2020-10-19 PROCEDURE — 360N000020 HC SURGERY LEVEL 3 1ST 30 MIN: Performed by: OBSTETRICS & GYNECOLOGY

## 2020-10-19 PROCEDURE — 250N000011 HC RX IP 250 OP 636: Performed by: ADVANCED PRACTICE MIDWIFE

## 2020-10-19 PROCEDURE — 370N000003 HC ANESTHESIA WARD SERVICE

## 2020-10-19 PROCEDURE — 59514 CESAREAN DELIVERY ONLY: CPT | Mod: AS | Performed by: ADVANCED PRACTICE MIDWIFE

## 2020-10-19 PROCEDURE — 272N000001 HC OR GENERAL SUPPLY STERILE: Performed by: OBSTETRICS & GYNECOLOGY

## 2020-10-19 PROCEDURE — 250N000011 HC RX IP 250 OP 636: Performed by: ANESTHESIOLOGY

## 2020-10-19 PROCEDURE — 258N000003 HC RX IP 258 OP 636: Performed by: ADVANCED PRACTICE MIDWIFE

## 2020-10-19 PROCEDURE — 360N000021 HC SURGERY LEVEL 3 EA 15 ADDTL MIN: Performed by: OBSTETRICS & GYNECOLOGY

## 2020-10-19 PROCEDURE — 250N000011 HC RX IP 250 OP 636: Performed by: OBSTETRICS & GYNECOLOGY

## 2020-10-19 PROCEDURE — 250N000009 HC RX 250: Performed by: ADVANCED PRACTICE MIDWIFE

## 2020-10-19 PROCEDURE — 84156 ASSAY OF PROTEIN URINE: CPT | Performed by: OBSTETRICS & GYNECOLOGY

## 2020-10-19 PROCEDURE — 250N000011 HC RX IP 250 OP 636: Performed by: NURSE ANESTHETIST, CERTIFIED REGISTERED

## 2020-10-19 PROCEDURE — 258N000003 HC RX IP 258 OP 636: Performed by: OBSTETRICS & GYNECOLOGY

## 2020-10-19 PROCEDURE — 250N000009 HC RX 250: Performed by: OBSTETRICS & GYNECOLOGY

## 2020-10-19 PROCEDURE — 250N000013 HC RX MED GY IP 250 OP 250 PS 637: Performed by: ADVANCED PRACTICE MIDWIFE

## 2020-10-19 PROCEDURE — 120N000012 HC R&B POSTPARTUM

## 2020-10-19 PROCEDURE — 59510 CESAREAN DELIVERY: CPT | Performed by: OBSTETRICS & GYNECOLOGY

## 2020-10-19 PROCEDURE — 80053 COMPREHEN METABOLIC PANEL: CPT | Performed by: OBSTETRICS & GYNECOLOGY

## 2020-10-19 PROCEDURE — 761N000003 HC RECOVERY PHASE 1 LEVEL 2 FIRST HR: Performed by: OBSTETRICS & GYNECOLOGY

## 2020-10-19 PROCEDURE — 258N000003 HC RX IP 258 OP 636: Performed by: NURSE ANESTHETIST, CERTIFIED REGISTERED

## 2020-10-19 RX ORDER — NALBUPHINE HYDROCHLORIDE 10 MG/ML
2.5-5 INJECTION, SOLUTION INTRAMUSCULAR; INTRAVENOUS; SUBCUTANEOUS EVERY 6 HOURS PRN
Status: DISCONTINUED | OUTPATIENT
Start: 2020-10-19 | End: 2020-10-20

## 2020-10-19 RX ORDER — MODIFIED LANOLIN
OINTMENT (GRAM) TOPICAL
Status: DISCONTINUED | OUTPATIENT
Start: 2020-10-19 | End: 2020-10-22 | Stop reason: HOSPADM

## 2020-10-19 RX ORDER — SIMETHICONE 80 MG
80 TABLET,CHEWABLE ORAL 4 TIMES DAILY PRN
Status: DISCONTINUED | OUTPATIENT
Start: 2020-10-19 | End: 2020-10-22 | Stop reason: HOSPADM

## 2020-10-19 RX ORDER — ONDANSETRON 2 MG/ML
4 INJECTION INTRAMUSCULAR; INTRAVENOUS EVERY 6 HOURS PRN
Status: DISCONTINUED | OUTPATIENT
Start: 2020-10-19 | End: 2020-10-20

## 2020-10-19 RX ORDER — CALCIUM CARBONATE 500 MG/1
500-1000 TABLET, CHEWABLE ORAL DAILY PRN
Status: DISCONTINUED | OUTPATIENT
Start: 2020-10-19 | End: 2020-10-19

## 2020-10-19 RX ORDER — AMOXICILLIN 250 MG
1 CAPSULE ORAL 2 TIMES DAILY
Status: DISCONTINUED | OUTPATIENT
Start: 2020-10-19 | End: 2020-10-22 | Stop reason: HOSPADM

## 2020-10-19 RX ORDER — CEFAZOLIN SODIUM 1 G/3ML
1 INJECTION, POWDER, FOR SOLUTION INTRAMUSCULAR; INTRAVENOUS SEE ADMIN INSTRUCTIONS
Status: DISCONTINUED | OUTPATIENT
Start: 2020-10-19 | End: 2020-10-19 | Stop reason: HOSPADM

## 2020-10-19 RX ORDER — MISOPROSTOL 200 UG/1
800 TABLET ORAL
Status: DISCONTINUED | OUTPATIENT
Start: 2020-10-19 | End: 2020-10-22 | Stop reason: HOSPADM

## 2020-10-19 RX ORDER — DIPHENHYDRAMINE HYDROCHLORIDE 50 MG/ML
50 INJECTION INTRAMUSCULAR; INTRAVENOUS ONCE
Status: COMPLETED | OUTPATIENT
Start: 2020-10-19 | End: 2020-10-19

## 2020-10-19 RX ORDER — OXYTOCIN/0.9 % SODIUM CHLORIDE 30/500 ML
100 PLASTIC BAG, INJECTION (ML) INTRAVENOUS CONTINUOUS
Status: DISCONTINUED | OUTPATIENT
Start: 2020-10-19 | End: 2020-10-22 | Stop reason: HOSPADM

## 2020-10-19 RX ORDER — TRANEXAMIC ACID 10 MG/ML
1 INJECTION, SOLUTION INTRAVENOUS EVERY 30 MIN PRN
Status: DISCONTINUED | OUTPATIENT
Start: 2020-10-19 | End: 2020-10-22 | Stop reason: HOSPADM

## 2020-10-19 RX ORDER — ROPIVACAINE HYDROCHLORIDE 2 MG/ML
10 INJECTION, SOLUTION EPIDURAL; INFILTRATION; PERINEURAL ONCE
Status: COMPLETED | OUTPATIENT
Start: 2020-10-19 | End: 2020-10-19

## 2020-10-19 RX ORDER — ACETAMINOPHEN 325 MG/1
975 TABLET ORAL ONCE
Status: COMPLETED | OUTPATIENT
Start: 2020-10-19 | End: 2020-10-19

## 2020-10-19 RX ORDER — OXYTOCIN/0.9 % SODIUM CHLORIDE 30/500 ML
340 PLASTIC BAG, INJECTION (ML) INTRAVENOUS CONTINUOUS PRN
Status: DISCONTINUED | OUTPATIENT
Start: 2020-10-19 | End: 2020-10-22 | Stop reason: HOSPADM

## 2020-10-19 RX ORDER — LIDOCAINE 40 MG/G
CREAM TOPICAL
Status: DISCONTINUED | OUTPATIENT
Start: 2020-10-19 | End: 2020-10-20

## 2020-10-19 RX ORDER — ONDANSETRON 2 MG/ML
4 INJECTION INTRAMUSCULAR; INTRAVENOUS EVERY 6 HOURS PRN
Status: DISCONTINUED | OUTPATIENT
Start: 2020-10-19 | End: 2020-10-22 | Stop reason: HOSPADM

## 2020-10-19 RX ORDER — CALCIUM CARBONATE 500 MG/1
1000 TABLET, CHEWABLE ORAL EVERY 4 HOURS PRN
Status: DISCONTINUED | OUTPATIENT
Start: 2020-10-19 | End: 2020-10-22 | Stop reason: HOSPADM

## 2020-10-19 RX ORDER — CARBOPROST TROMETHAMINE 250 UG/ML
INJECTION, SOLUTION INTRAMUSCULAR
Status: DISCONTINUED
Start: 2020-10-19 | End: 2020-10-19 | Stop reason: WASHOUT

## 2020-10-19 RX ORDER — EPHEDRINE SULFATE 50 MG/ML
5 INJECTION, SOLUTION INTRAMUSCULAR; INTRAVENOUS; SUBCUTANEOUS
Status: DISCONTINUED | OUTPATIENT
Start: 2020-10-19 | End: 2020-10-20

## 2020-10-19 RX ORDER — ONDANSETRON 4 MG/1
4 TABLET, ORALLY DISINTEGRATING ORAL EVERY 6 HOURS PRN
Status: DISCONTINUED | OUTPATIENT
Start: 2020-10-19 | End: 2020-10-20

## 2020-10-19 RX ORDER — IBUPROFEN 600 MG/1
600 TABLET, FILM COATED ORAL EVERY 6 HOURS PRN
Status: DISCONTINUED | OUTPATIENT
Start: 2020-10-20 | End: 2020-10-22 | Stop reason: HOSPADM

## 2020-10-19 RX ORDER — DIPHENHYDRAMINE HYDROCHLORIDE 50 MG/ML
25 INJECTION INTRAMUSCULAR; INTRAVENOUS EVERY 6 HOURS PRN
Status: DISCONTINUED | OUTPATIENT
Start: 2020-10-19 | End: 2020-10-22 | Stop reason: HOSPADM

## 2020-10-19 RX ORDER — OXYCODONE HYDROCHLORIDE 5 MG/1
5 TABLET ORAL EVERY 4 HOURS PRN
Status: DISCONTINUED | OUTPATIENT
Start: 2020-10-19 | End: 2020-10-22 | Stop reason: HOSPADM

## 2020-10-19 RX ORDER — NALOXONE HYDROCHLORIDE 0.4 MG/ML
.1-.4 INJECTION, SOLUTION INTRAMUSCULAR; INTRAVENOUS; SUBCUTANEOUS
Status: DISCONTINUED | OUTPATIENT
Start: 2020-10-19 | End: 2020-10-20

## 2020-10-19 RX ORDER — PROCHLORPERAZINE 25 MG
25 SUPPOSITORY, RECTAL RECTAL EVERY 12 HOURS PRN
Status: DISCONTINUED | OUTPATIENT
Start: 2020-10-19 | End: 2020-10-22 | Stop reason: HOSPADM

## 2020-10-19 RX ORDER — SODIUM CHLORIDE, SODIUM LACTATE, POTASSIUM CHLORIDE, CALCIUM CHLORIDE 600; 310; 30; 20 MG/100ML; MG/100ML; MG/100ML; MG/100ML
INJECTION, SOLUTION INTRAVENOUS CONTINUOUS
Status: DISCONTINUED | OUTPATIENT
Start: 2020-10-19 | End: 2020-10-19 | Stop reason: HOSPADM

## 2020-10-19 RX ORDER — LIDOCAINE HCL/EPINEPHRINE/PF 2%-1:200K
VIAL (ML) INJECTION PRN
Status: DISCONTINUED | OUTPATIENT
Start: 2020-10-19 | End: 2020-10-19

## 2020-10-19 RX ORDER — ACETAMINOPHEN 325 MG/1
975 TABLET ORAL EVERY 6 HOURS
Status: DISCONTINUED | OUTPATIENT
Start: 2020-10-20 | End: 2020-10-22 | Stop reason: HOSPADM

## 2020-10-19 RX ORDER — METOCLOPRAMIDE HYDROCHLORIDE 5 MG/ML
10 INJECTION INTRAMUSCULAR; INTRAVENOUS EVERY 6 HOURS PRN
Status: DISCONTINUED | OUTPATIENT
Start: 2020-10-19 | End: 2020-10-22 | Stop reason: HOSPADM

## 2020-10-19 RX ORDER — KETOROLAC TROMETHAMINE 30 MG/ML
30 INJECTION, SOLUTION INTRAMUSCULAR; INTRAVENOUS EVERY 6 HOURS
Status: COMPLETED | OUTPATIENT
Start: 2020-10-19 | End: 2020-10-20

## 2020-10-19 RX ORDER — ONDANSETRON 2 MG/ML
INJECTION INTRAMUSCULAR; INTRAVENOUS PRN
Status: DISCONTINUED | OUTPATIENT
Start: 2020-10-19 | End: 2020-10-19

## 2020-10-19 RX ORDER — OXYTOCIN 10 [USP'U]/ML
10 INJECTION, SOLUTION INTRAMUSCULAR; INTRAVENOUS
Status: DISCONTINUED | OUTPATIENT
Start: 2020-10-19 | End: 2020-10-22 | Stop reason: HOSPADM

## 2020-10-19 RX ORDER — FAMOTIDINE 20 MG/1
20 TABLET, FILM COATED ORAL 3 TIMES DAILY PRN
Status: DISCONTINUED | OUTPATIENT
Start: 2020-10-19 | End: 2020-10-22 | Stop reason: HOSPADM

## 2020-10-19 RX ORDER — MORPHINE SULFATE 10 MG/ML
INJECTION, SOLUTION INTRAMUSCULAR; INTRAVENOUS PRN
Status: DISCONTINUED | OUTPATIENT
Start: 2020-10-19 | End: 2020-10-19

## 2020-10-19 RX ORDER — NALOXONE HYDROCHLORIDE 0.4 MG/ML
.1-.4 INJECTION, SOLUTION INTRAMUSCULAR; INTRAVENOUS; SUBCUTANEOUS
Status: DISCONTINUED | OUTPATIENT
Start: 2020-10-19 | End: 2020-10-22 | Stop reason: HOSPADM

## 2020-10-19 RX ORDER — CEFAZOLIN SODIUM 2 G/100ML
2 INJECTION, SOLUTION INTRAVENOUS
Status: COMPLETED | OUTPATIENT
Start: 2020-10-19 | End: 2020-10-19

## 2020-10-19 RX ORDER — DEXTROSE, SODIUM CHLORIDE, SODIUM LACTATE, POTASSIUM CHLORIDE, AND CALCIUM CHLORIDE 5; .6; .31; .03; .02 G/100ML; G/100ML; G/100ML; G/100ML; G/100ML
INJECTION, SOLUTION INTRAVENOUS CONTINUOUS
Status: DISCONTINUED | OUTPATIENT
Start: 2020-10-19 | End: 2020-10-22 | Stop reason: HOSPADM

## 2020-10-19 RX ORDER — ACETAMINOPHEN 325 MG/1
650 TABLET ORAL EVERY 4 HOURS PRN
Status: DISCONTINUED | OUTPATIENT
Start: 2020-10-22 | End: 2020-10-22 | Stop reason: HOSPADM

## 2020-10-19 RX ORDER — HYDROCORTISONE 2.5 %
CREAM (GRAM) TOPICAL 3 TIMES DAILY PRN
Status: DISCONTINUED | OUTPATIENT
Start: 2020-10-19 | End: 2020-10-22 | Stop reason: HOSPADM

## 2020-10-19 RX ORDER — CARBOPROST TROMETHAMINE 250 UG/ML
250 INJECTION, SOLUTION INTRAMUSCULAR
Status: DISCONTINUED | OUTPATIENT
Start: 2020-10-19 | End: 2020-10-22 | Stop reason: HOSPADM

## 2020-10-19 RX ORDER — AZITHROMYCIN 500 MG/1
500 INJECTION, POWDER, LYOPHILIZED, FOR SOLUTION INTRAVENOUS
Status: COMPLETED | OUTPATIENT
Start: 2020-10-19 | End: 2020-10-19

## 2020-10-19 RX ORDER — HYDROMORPHONE HYDROCHLORIDE 1 MG/ML
.3-.5 INJECTION, SOLUTION INTRAMUSCULAR; INTRAVENOUS; SUBCUTANEOUS EVERY 30 MIN PRN
Status: DISCONTINUED | OUTPATIENT
Start: 2020-10-19 | End: 2020-10-22 | Stop reason: HOSPADM

## 2020-10-19 RX ORDER — DIPHENHYDRAMINE HCL 25 MG
25 CAPSULE ORAL EVERY 6 HOURS PRN
Status: DISCONTINUED | OUTPATIENT
Start: 2020-10-19 | End: 2020-10-22 | Stop reason: HOSPADM

## 2020-10-19 RX ORDER — LIDOCAINE 40 MG/G
CREAM TOPICAL
Status: DISCONTINUED | OUTPATIENT
Start: 2020-10-19 | End: 2020-10-22 | Stop reason: HOSPADM

## 2020-10-19 RX ORDER — BISACODYL 10 MG
10 SUPPOSITORY, RECTAL RECTAL DAILY PRN
Status: DISCONTINUED | OUTPATIENT
Start: 2020-10-21 | End: 2020-10-22 | Stop reason: HOSPADM

## 2020-10-19 RX ORDER — AMOXICILLIN 250 MG
2 CAPSULE ORAL 2 TIMES DAILY
Status: DISCONTINUED | OUTPATIENT
Start: 2020-10-19 | End: 2020-10-22 | Stop reason: HOSPADM

## 2020-10-19 RX ORDER — DIPHENHYDRAMINE HYDROCHLORIDE 50 MG/ML
INJECTION INTRAMUSCULAR; INTRAVENOUS
Status: DISCONTINUED
Start: 2020-10-19 | End: 2020-10-19 | Stop reason: HOSPADM

## 2020-10-19 RX ORDER — CITRIC ACID/SODIUM CITRATE 334-500MG
30 SOLUTION, ORAL ORAL
Status: COMPLETED | OUTPATIENT
Start: 2020-10-19 | End: 2020-10-19

## 2020-10-19 RX ORDER — CEFAZOLIN SODIUM 2 G/100ML
2 INJECTION, SOLUTION INTRAVENOUS EVERY 8 HOURS
Status: COMPLETED | OUTPATIENT
Start: 2020-10-20 | End: 2020-10-20

## 2020-10-19 RX ADMIN — ONDANSETRON 4 MG: 2 INJECTION INTRAMUSCULAR; INTRAVENOUS at 19:45

## 2020-10-19 RX ADMIN — Medication 100 ML/HR: at 23:55

## 2020-10-19 RX ADMIN — MORPHINE SULFATE 2 MG: 10 INJECTION, SOLUTION INTRAMUSCULAR; INTRAVENOUS at 19:45

## 2020-10-19 RX ADMIN — Medication 340 ML/HR: at 19:43

## 2020-10-19 RX ADMIN — FAMOTIDINE 20 MG: 20 TABLET ORAL at 06:31

## 2020-10-19 RX ADMIN — PHENYLEPHRINE HYDROCHLORIDE 100 MCG: 10 INJECTION INTRAVENOUS at 19:51

## 2020-10-19 RX ADMIN — METHYLERGONOVINE MALEATE 200 MCG: 0.2 INJECTION INTRAMUSCULAR; INTRAVENOUS at 19:49

## 2020-10-19 RX ADMIN — PHENYLEPHRINE HYDROCHLORIDE 100 MCG: 10 INJECTION INTRAVENOUS at 19:53

## 2020-10-19 RX ADMIN — Medication 12 ML/HR: at 11:25

## 2020-10-19 RX ADMIN — DOCUSATE SODIUM 50 MG AND SENNOSIDES 8.6 MG 1 TABLET: 8.6; 5 TABLET, FILM COATED ORAL at 23:48

## 2020-10-19 RX ADMIN — DIPHENHYDRAMINE HYDROCHLORIDE 50 MG: 50 INJECTION, SOLUTION INTRAMUSCULAR; INTRAVENOUS at 09:23

## 2020-10-19 RX ADMIN — ROPIVACAINE HYDROCHLORIDE 5 ML: 2 INJECTION, SOLUTION EPIDURAL; INFILTRATION at 00:38

## 2020-10-19 RX ADMIN — SODIUM CHLORIDE, POTASSIUM CHLORIDE, SODIUM LACTATE AND CALCIUM CHLORIDE: 600; 310; 30; 20 INJECTION, SOLUTION INTRAVENOUS at 03:23

## 2020-10-19 RX ADMIN — CALCIUM CARBONATE (ANTACID) CHEW TAB 500 MG 1000 MG: 500 CHEW TAB at 01:08

## 2020-10-19 RX ADMIN — SODIUM CHLORIDE, POTASSIUM CHLORIDE, SODIUM LACTATE AND CALCIUM CHLORIDE: 600; 310; 30; 20 INJECTION, SOLUTION INTRAVENOUS at 17:25

## 2020-10-19 RX ADMIN — CALCIUM CARBONATE (ANTACID) CHEW TAB 500 MG 1000 MG: 500 CHEW TAB at 17:25

## 2020-10-19 RX ADMIN — SODIUM CITRATE AND CITRIC ACID MONOHYDRATE 30 ML: 500; 334 SOLUTION ORAL at 19:05

## 2020-10-19 RX ADMIN — ONDANSETRON 4 MG: 2 INJECTION INTRAMUSCULAR; INTRAVENOUS at 06:57

## 2020-10-19 RX ADMIN — PHENYLEPHRINE HYDROCHLORIDE 0.4 MCG/KG/MIN: 10 INJECTION INTRAVENOUS at 19:17

## 2020-10-19 RX ADMIN — LIDOCAINE HYDROCHLORIDE,EPINEPHRINE BITARTRATE 15 ML: 20; .005 INJECTION, SOLUTION EPIDURAL; INFILTRATION; INTRACAUDAL; PERINEURAL at 19:17

## 2020-10-19 RX ADMIN — Medication 12 ML/HR: at 06:05

## 2020-10-19 RX ADMIN — Medication 12 ML/HR: at 00:43

## 2020-10-19 RX ADMIN — ACETAMINOPHEN 975 MG: 325 TABLET, FILM COATED ORAL at 19:04

## 2020-10-19 RX ADMIN — CEFAZOLIN SODIUM 2 G: 2 INJECTION, SOLUTION INTRAVENOUS at 19:20

## 2020-10-19 RX ADMIN — AZITHROMYCIN MONOHYDRATE 500 MG: 500 INJECTION, POWDER, LYOPHILIZED, FOR SOLUTION INTRAVENOUS at 19:03

## 2020-10-19 RX ADMIN — SODIUM CHLORIDE, POTASSIUM CHLORIDE, SODIUM LACTATE AND CALCIUM CHLORIDE: 600; 310; 30; 20 INJECTION, SOLUTION INTRAVENOUS at 18:56

## 2020-10-19 RX ADMIN — KETOROLAC TROMETHAMINE 30 MG: 30 INJECTION, SOLUTION INTRAMUSCULAR at 21:07

## 2020-10-19 RX ADMIN — ROPIVACAINE HYDROCHLORIDE 5 ML: 2 INJECTION, SOLUTION EPIDURAL; INFILTRATION at 00:35

## 2020-10-19 RX ADMIN — SODIUM CHLORIDE, POTASSIUM CHLORIDE, SODIUM LACTATE AND CALCIUM CHLORIDE 125 ML/HR: 600; 310; 30; 20 INJECTION, SOLUTION INTRAVENOUS at 11:10

## 2020-10-19 ASSESSMENT — LIFESTYLE VARIABLES: TOBACCO_USE: 0

## 2020-10-19 NOTE — PROGRESS NOTES
"CNM PROGRESS NOTE    SUBJECTIVE:  Mireya is breathing with some contractions feeling intense vaginal pressure. Unable to move legs well and not feeling contraction pain just pressure. Daisy Combs asking about position of baby and plans to help facilitate side lying release.    OBJECTIVE:  /57   Temp 98.6  F (37  C)   Resp 16   Ht 1.778 m (5' 10\")   Wt 117 kg (257 lb 15.7 oz)   LMP 2020   SpO2 90%   BMI 37.02 kg/m      Fetal heart tones: Baseline 130  Variability: moderate  Accelerations: present  Decelerations: absent    Contractions: Pt is loni every 1-2  minutes, after placing IUPC no resting adequate resting tone    Cervix: 9.5/ 90% / -1 to 0, Vtx, unable to palpate sutures due to caput/molding  ROM: clear fluid    Pitocin- 10 mu/min. Turned off    ASSESSMENT:  IUP @ 41w5d minimal/no progress   GBS- negative  COVID neg  SROM x 31 hours  Prolonged rupture of membranes  Afebrile  Periods of maternal tachycardia   IUPC in place    PLAN:   Reviewed labor progress, anterior lip still prominent, recommend IUPC placement to determine adequate labor  Discussed has had minimal change since 0700, was 8/-1 now 9+ -1 but with swollen anterior lip, already given IV benadryl x 1 for swelling without success  Reviewed suspect posterior position due to shape of patient belly, blood in urine  Continue position changes  IUPC placed with patient consent  Discontinue pitocin, restart in 15 minutes after fluid bolus, recheck after 2 hours of adequate labor  Discussed with patient prolonged rupture, possible malposition, safety etc and possible need for  if no change occurs with adequate labor  reevaluate in 2-4 hours/PRN    KAILYN Conley CNKAYLA      20 minutes was spent face to face with the patient today discussing her history, diagnosis, and follow-up plan as noted above. Over 50% of the visit was spent in counseling and coordination of care.    Total Visit Time: 20 minutes.       "

## 2020-10-19 NOTE — PLAN OF CARE
PT breathing through contractions. PT states she is feeling vaginal pressure and pain rating 4/10. PT has been pushing her PCEA button frequently. PT is unable to  her legs and buttocks. Explained to patient that epidural does not  take away pressure and does not work as well when baby is low. Discussed with patient a bolus but that she is already very numb and pressure is helpful for pushing. PT states she can tolerate for now and will wait to see what SVE is at 1100.

## 2020-10-19 NOTE — PROGRESS NOTES
CNM PROGRESS NOTE    SUBJECTIVE:  Mireya labored in her room using the birth ball and ambulating, she then received an epidural for pain just after midnight. She is now comfortable with her epidural, not feeling an rectal pressure. Her  Daisy and partner Vinod are at the bedside. She has had a couple episodes of vomiting due to acid reflux.     OBJECTIVE:  /59   Temp 98.6  F (37  C) (Temporal)   Resp 16   LMP 2020   SpO2 97%     Fetal heart tones: Baseline 125   Variability: moderate   Accelerations: present  Decelerations: absent    Contractions: Pt is loni every 2-3 minutes, lasting 50-70 seconds and palpates moderate    Cervix: 8cm/ 90% / -1  ROM: clear fluid, possible forebag felt on last exam. Ruptured since 0450 on 10/18/20    Pitocin- 10 mu/min.  Antibiotics- none    ASSESSMENT:  IUP @ 41w5d active labor   GBS- negative  Prolonged rupture of membranes (26 hours)  Afebrile  No s/sx of infection     PLAN:   Continue to utilize peanut ball and position changes to facilitate fetal descent and rotation   Continue to monitor closely for infection. Discussed possible forebag felt on last exam, if felt on next exam discussed rupture of forebag.   Pitocin per unit protocol   Anticipate   Report given to on coming ELIDA Dave who plans to see Mireya around 0900 unless needed sooner.     KAILYN Box, ELIDA

## 2020-10-19 NOTE — H&P
[facility] Labor Admission History & Physical    Mireya Whitten is a 35 year old  with an IUP at 41w4d  ; ,   Partner/support Person: Vinod  Language Barrier: English  [facility]  Provider: CN's    Mireya Whitten is admitted to the Birth[facility] at [facility] on 10/18/2020 at 7:45 PM       History of present inllness/Chief Complaint:  Kristinas water broke this morning at 0450, fluid was clear/pink tinged and she was not having any contractons. I spoke with her mid-morning and discussed r/b/a to expectant management at home vs presenting to L&D for IOL. Mireya opted for expectant management at home, I advised she present to L&D by 8 hours post-rupture if no contractions. She presented to L&D around 1600. Fluid remains clear. No s/sx of infection.     Here with: spontaneous rupture of membranes  Patient reports contractions are None      Baby active Yes  Membranes are ruptured since 0450   Bloody show No   Any changes with medical history since last prenatal visit No      Obstetrical history  Estimated Date of Delivery: Oct 7, 2020 determined by LMP  Patient's last menstrual period was 2020.   Dating U/S: 2020    Fetal anatomic survey: Normal  Placenta: posterior     PRENATAL COURSE  Prenatal care began at 10 wks gestation for a total of 11 prenatal visits.  Total wt gain 30; There is no height or weight on file to calculate BMI.  Prenatal Blood Pressure: WNL  Prenatal course was complicated by obesity  Tdap: 20    Patient Active Problem List   Diagnosis     Other abnormal Papanicolaou smear of cervix and cervical HPV(795.09)     Supervision of high risk pregnancy in third trimester     Obesity affecting pregnancy in third trimester     BMI 32.0-32.9,adult     Indication for care in labor or delivery       HISTORY  Allergies   Allergen Reactions     Seasonal Allergies      History reviewed. No pertinent past medical history.  Past Surgical  History:   Procedure Laterality Date     DENTAL SURGERY  2006     NO HISTORY OF SURGERY       Family History   Problem Relation Age of Onset     Hypertension Maternal Grandmother      Cerebrovascular Disease Paternal Grandfather         in 50's/ brain aneurysm/ CVA     Hyperlipidemia Mother      Depression Father      No Known Problems Sister      No Known Problems Brother      No Known Problems Maternal Grandfather      No Known Problems Paternal Grandmother      No Known Problems Other      Social History     Tobacco Use     Smoking status: Never Smoker     Smokeless tobacco: Never Used   Substance Use Topics     Alcohol use: Not Currently     Alcohol/week: 6.7 standard drinks     Types: 8 Standard drinks or equivalent per week     OB History    Para Term  AB Living   2 0 0 0 1 0   SAB TAB Ectopic Multiple Live Births   0 0 0 0 0      # Outcome Date GA Lbr Terry/2nd Weight Sex Delivery Anes PTL Lv   2 Current            1 AB 2009               LABS:  Lab Results   Component Value Date    ABO A 10/18/2020    RH Pos 10/18/2020    AS Neg 10/18/2020    HGB 13.2 10/18/2020    HEPBANG Nonreactive 2020       GBS Status:   Lab Results   Component Value Date    GBS Negative 2020     Rubella: Immune    HIV: Non-Reactive   Platelets:  209  1hr GCT:  120    ROS   Pt is alert and oriented  Pt denies significant constitutional symptoms including fever and/or malaise.    Pt denies significant respiratory, cardiovacular, GI, or muscular/skeletal complaints.    Neuro: Denies HA and visual changes  Muscoloskeletal: Denies except for discomforts r/t pregnancy     PHYSICAL EXAM:  /81   Temp 98.4  F (36.9  C) (Temporal)   Resp 18   LMP 2020   General appearance:  healthy, alert and active   Heart: RRR  Lungs: CTA bilaterally, normal respiratory effort  Abdomen: gravid, single vertex fetus, non-tender, EFW 8 lbs.   Legs:  2+ edema bilaterally     Contractions: Pt is loni in an irregular  pattern upon arrival to unit    Fetal heart tones: Baseline 140   Variability: moderate   Accelerations: present  Decelerations: absent    NST: reactive    Cervix: 0-1cm/ 50%/ Mid/ soft/ -2 per RN exam. Cephalic by US on 10/15/20 and by Leopolds today  Bloody show: no  Membranes:  Ruptured since 450    ASSESSMENT:  35 year old  with askew IUP 41w4d ruptured with no labor  NST reactive  GBS negative and membranes ruptured for 14.5 hours  Afebrile  No s/sx of infection  COVID negative   Obesity     PLAN:  Routine CNM care  Labs ordered: CBC with platelets, treponema, type and screen   Teaching done r/t comfort measures, pain management options, labor processes, induction process, s/sx of infection   Admit - see IP orders  Labor induction with Pitocin  Pain medication: options reviewed, Mireya is aware of what is available. Is planning an epidural at some point   Anticipate LUCRECIA Krishnamurthy, KAILYN, CNM    30 minutes was spent face to face with the patient today discussing her history, diagnosis, and follow-up plan as noted above. Over 50% of the visit was spent in counseling and coordination of care.    Total Visit Time: 30 minutes.

## 2020-10-19 NOTE — PROGRESS NOTES
MD Consult Note    [facility] Labor and Delivery Progress Note    Mireya Whitten MRN# [id number]   Age: 35 year old YOB: 1985           Subjective:   Patient has had a protracted active phase with prolonged ruptured membranes. She progressed to complete dilation and pushed for 3 hours with no fetal descent.           Objective:     Patient Vitals for the past 24 hrs:   BP Temp Temp src Resp SpO2 Oximeter Heart Rate   10/19/20 1832 (!) 142/94 -- -- -- -- 114 bpm   10/19/20 1831 -- 99.3  F (37.4  C) Temporal 18 -- --   10/19/20 1739 -- 99  F (37.2  C) Temporal 20 -- --   10/19/20 1735 -- -- -- -- (!) 86 % 125 bpm   10/19/20 1704 (!) 147/71 -- -- -- -- 105 bpm   10/19/20 1700 -- -- -- -- 99 % 113 bpm   10/19/20 1635 125/76 -- -- -- 97 % 105 bpm   10/19/20 1630 -- -- -- -- 96 % 121 bpm   10/19/20 1620 -- 98.8  F (37.1  C) Temporal -- 99 % 136 bpm   10/19/20 1604 132/61 -- -- 22 -- 110 bpm   10/19/20 1556 -- 99.9  F (37.7  C) Temporal -- -- --   10/19/20 1534 120/62 -- -- -- 97 % 97 bpm   10/19/20 1520 -- 99.9  F (37.7  C) Temporal -- 99 % 109 bpm   10/19/20 1500 133/75 -- -- -- 99 % 101 bpm   10/19/20 1447 -- 98.8  F (37.1  C) -- -- -- --   10/19/20 1430 130/84 -- -- -- 100 % 105 bpm   10/19/20 1400 137/77 -- -- -- 96 % 104 bpm   10/19/20 1330 122/65 98.4  F (36.9  C) -- -- 98 % 108 bpm   10/19/20 1300 122/89 -- -- -- 93 % 102 bpm   10/19/20 1230 119/68 98.8  F (37.1  C) -- -- 91 % 98 bpm   10/19/20 1200 125/75 -- -- -- 92 % 115 bpm   10/19/20 1145 -- 99.1  F (37.3  C) -- -- -- --   10/19/20 1130 129/70 -- -- -- 90 % 128 bpm   10/19/20 1100 129/80 -- -- -- 91 % 106 bpm   10/19/20 1030 125/59 -- -- -- (!) 85 % 115 bpm   10/19/20 1003 -- -- -- -- 90 % 108 bpm   10/19/20 1000 117/57 -- -- -- -- --   10/19/20 0958 -- -- -- -- 91 % 109 bpm   10/19/20 0955 -- -- -- -- 90 % 111 bpm   10/19/20 0953 -- -- -- -- 91 % 122 bpm   10/19/20 0947 -- -- -- -- 93 % 129 bpm   10/19/20 0942 -- -- -- -- 91 % 124  bpm   10/19/20 0941 -- -- -- -- 90 % 117 bpm   10/19/20 0937 -- -- -- -- 93 % 127 bpm   10/19/20 0930 127/79 -- -- -- 93 % 125 bpm   10/19/20 0927 -- -- -- -- 93 % 110 bpm   10/19/20 0926 -- 98.6  F (37  C) -- -- -- --   10/19/20 0900 118/70 -- -- -- 90 % 104 bpm   10/19/20 0830 122/73 -- -- -- 90 % 107 bpm   10/19/20 0828 -- 98.6  F (37  C) -- -- -- --   10/19/20 0800 -- -- -- -- 100 % 118 bpm   10/19/20 0736 -- 98.2  F (36.8  C) -- -- -- --   10/19/20 0730 123/64 -- -- -- 95 % 107 bpm   10/19/20 0700 -- -- -- -- 97 % 132 bpm   10/19/20 0630 -- -- -- -- 98 % 118 bpm   10/19/20 0600 123/59 -- -- -- 97 % 119 bpm   10/19/20 0530 126/62 -- -- -- 98 % 116 bpm   10/19/20 0505 124/68 -- -- -- 98 % 104 bpm   10/19/20 0500 124/68 -- -- -- 96 % 104 bpm   10/19/20 0430 126/76 -- -- -- 94 % 96 bpm   10/19/20 0400 137/78 -- -- 16 96 % 137 bpm   10/19/20 0330 117/75 -- -- 16 95 % 114 bpm   10/19/20 0300 107/62 -- -- 16 90 % 117 bpm   10/19/20 0230 102/57 -- -- 16 94 % 125 bpm   10/19/20 0200 107/60 98.6  F (37  C) Temporal -- 99 % 104 bpm   10/19/20 0130 117/58 -- -- 16 98 % 97 bpm   10/19/20 0102 114/65 -- -- 16 96 % 125 bpm   10/19/20 0100 -- -- -- -- 96 % 104 bpm   10/19/20 0057 125/68 -- -- 16 96 % 120 bpm   10/19/20 0052 130/63 -- -- 16 97 % 120 bpm   10/19/20 0046 129/72 -- -- 16 98 % 100 bpm   10/19/20 0044 128/75 -- -- 16 99 % 102 bpm   10/19/20 0042 128/75 -- -- 16 99 % 82 bpm   10/19/20 0040 132/80 -- -- 16 96 % 93 bpm   10/19/20 0038 130/75 98.2  F (36.8  C) -- 16 100 % 82 bpm   10/19/20 0036 136/77 -- -- 16 100 % 93 bpm   10/19/20 0030 -- -- -- -- 100 % 99 bpm   10/18/20 2300 -- 98.2  F (36.8  C) Temporal -- -- --   10/18/20 2136 (!) 156/71 -- -- -- -- 105 bpm   10/18/20 2134 -- 98.2  F (36.8  C) Temporal 20 -- --   10/18/20 2030 -- 98.2  F (36.8  C) Temporal -- -- --   10/18/20 1917 132/81 -- -- -- -- 120 bpm   10/18/20 1900 (!) 138/94 98.4  F (36.9  C) Temporal 18 -- 117 bpm         Cervical Exam: 10 / 100% / 1       CNM Exam    Membranes: Leaking     Fetal Heart Rate:    Monitor: external US    Variability: moderate (amplitude range 6 to 25 bpm)    Baseline Rate: normal range    Fetal Heart Rate Tracinbpm/ +accels/ no decels          Assessment:   Mireya Whitten is a 35 year old  who is 41w5d here with arrest of descent and elevated blood pressures.          Plan:   Mireya was counseled about progressing with a primary  section for arrest of descent. The risks of the procedure including but not limited to bleeding and infection and damage to nearby structures. She is accepting of a blood transfusion if medically necessary.  Will also order preeclamptic labs to rule out preeclampsia.      Taylor Casas MD

## 2020-10-19 NOTE — PROGRESS NOTES
"CNM PROGRESS NOTE    SUBJECTIVE:  Mireya has been resting comfortable and sleeping on and off throughout most of the morning. Feeling some rectal pressure now. Okay with being patient and waiting for baby.     OBJECTIVE:  /77   Temp 98.4  F (36.9  C)   Resp 16   Ht 1.778 m (5' 10\")   Wt 117 kg (257 lb 15.7 oz)   LMP 2020   SpO2 96%   BMI 37.02 kg/m      Fetal heart tones: Baseline 135   Variability: moderate  Accelerations: present  Decelerations: absent    Contractions: Pt is loni every 2-3 minutes, lasting  seconds and  @ 1400    Cervix: Anterior lip/100/+1, unable to reduce, vertex  ROM: clear fluid    Pitocin- 8 mu/min.    ASSESSMENT:  IUP @ 41w5d minimal/no progress   GBS- negative  COVID neg  SROM x 34 hours  Prolonged rupture of membranes  Afebrile  Periods of maternal tachycardia   IUPC in place    PLAN:   Adequate labor x 1.5 hours  Continue monitoring closely for s/sx of infection  Some progress with positioning, fetal head feel like it has rotated and come down but unable to reduce anterior lip  Patient repositioned using spinning babies flying cowgirl position to widen pelvic outlet in hopes of reducing anterior lip   Titrate pitocin per protocol to maintain adeqaute labor  Discussed warning signs of infection and if unable to relieve anterior lip with positioning, reduction, adequate labor, we will need to consult OB and likely proceed with   Reevaluate at 1500 after 2 full hours of adequate labor    KAILYN Conley CNM      20 minutes was spent face to face with the patient today discussing her history, diagnosis, and follow-up plan as noted above. Over 50% of the visit was spent in counseling and coordination of care.    Total Visit Time: 20 minutes.       "

## 2020-10-19 NOTE — PROGRESS NOTES
"CNM PROGRESS NOTE    SUBJECTIVE:  I hope the cervix is gone, so ready to meet baby.    OBJECTIVE:  /75   Temp 99.9  F (37.7  C) (Temporal)   Resp 16   Ht 1.778 m (5' 10\")   Wt 117 kg (257 lb 15.7 oz)   LMP 2020   SpO2 99%   BMI 37.02 kg/m      Fetal heart tones: [id number]  Variability: moderate  Accelerations: present  Decelerations: absent    Contractions: Pt is loni every 2.5-3.5 minutes, lasting 60-90 seconds and palpates strong      Cervix: 10/ 100% / +1, Vtx  ROM: clear fluid    Pitocin- 8 mu/min.    ASSESSMENT:  IUP @ 41w5d   Second stage labor  GBS- negative  COVID neg  SROM x 35.5 hours  Prolonged rupture of membranes  Afebrile  Periods of maternal tachycardia   IUPC in place    PLAN:   Able to reduce anterior lip with effective push  Verified complete/+1 by CNM and RN  Begin active pushing  IUPC removed   Discussed pushing positions  Continue pitocin per protocol  Continue monitoring closing for s/sx of infection  Anticipate     KAILYN Conley CNKAYLA      20 minutes was spent face to face with the patient today discussing her history, diagnosis, and follow-up plan as noted above. Over 50% of the visit was spent in counseling and coordination of care.    Total Visit Time: 20 minutes.       "

## 2020-10-19 NOTE — PROGRESS NOTES
"CNM PROGRESS NOTE    SUBJECTIVE:  \"I am starting to feel some pressure. I am ready to meet baby.\" Viond and acosta supportive.     OBJECTIVE:  /70   Temp 98.6  F (37  C)   Resp 16   LMP 01/01/2020   SpO2 90%     Fetal heart tones: Baseline 130   Variability: moderate  Accelerations: present  Decelerations: present  Some late, variable,  Intermittent    Contractions: Pt is loni every 2-3 minutes, lasting 60-80 seconds and palpates moderate    Cervix: 9.5/ 90% / -1, Vtx, some molding, puffy anterior lip  ROM: clear fluid    Pitocin- 8 mu/min.    ASSESSMENT:  IUP @ 41w5d active labor and minimal/no progress   GBS- negative  COVID neg  SROM x 29 hours  Prolonged rupture of membranes  Afebrile  Period of maternal tachycardia     PLAN:   comfort measures prn  Repositioned and given IV benadryl for puffy anterior lip  Continue position changes to facilitate descent  Monitor closely for signs of infection   Periods of maternal tachycardia, has had periods of tachycardia since admission 104-156, some with activity some at rest, patient is unaware and asymptomatic  Labor augmentation with Pitocin  reevaluate in 2-4 hours/PRN    KAILYN Conley CNM    10 minutes was spent face to face with the patient today discussing her history, diagnosis, and follow-up plan as noted above. Over 50% of the visit was spent in counseling and coordination of care.    Total Visit Time: 10 minutes.       "

## 2020-10-19 NOTE — PROGRESS NOTES
"CNM PROGRESS NOTE    SUBJECTIVE:  Patient is tearful after discussion about lack of progress in second stage. Wishes to discuss with  and  but leaning toward .    OBJECTIVE:  BP (!) 142/94   Temp 99.3  F (37.4  C) (Temporal)   Resp 16   Ht 1.778 m (5' 10\")   Wt 117 kg (257 lb 15.7 oz)   LMP 2020   SpO2 99%   BMI 37.02 kg/m      Fetal heart tones: Baseline 140   Variability: moderate  Accelerations: present  Decelerations: present-variables with pushing    Contractions: Pt is loni every 2-3 minutes    Cervix: Complete/caput +1  ROM: clear fluid    Pitocin- 12 mu/min.    ASSESSMENT:  Second stage labor  GBS- negative  COVID neg  SROM x 38 hours  Prolonged rupture of membranes  Afebrile  Periods of maternal tachycardia      PLAN:   Discussed no progress after 2hr 20 minutes of active pushing with good maternal effort  Suspect asynclitic position, sutures palpated maternal 1 o'clock  Pushed with narrow knees, semi fowlers, and left side lying  MD consultant on call Dr. Casas called for  consult  Transfer of care to MD Management for  delivery  CNM to assist    KAILYN Conley CNM      120 minutes was spent face to face with the patient today discussing her history, diagnosis, and follow-up plan as noted above. Over 50% of the visit was spent in counseling and coordination of care.    Total Visit Time: 120 minutes.       "

## 2020-10-19 NOTE — ANESTHESIA PREPROCEDURE EVALUATION
"Anesthesia Pre-Procedure Evaluation    Patient: Mireya Whitten   MRN: [id number] : 1985          Preoperative Diagnosis: * No surgery found *        History reviewed. No pertinent past medical history.  Past Surgical History:   Procedure Laterality Date     DENTAL SURGERY  2006     NO HISTORY OF SURGERY                          Lab Results   Component Value Date    WBC 13.7 (H) 10/18/2020    HGB 13.2 10/18/2020    HCT 39.4 10/18/2020     10/18/2020    GLC 77 2017    TSH 2.44 2020       Preop Vitals  BP Readings from Last 3 Encounters:   10/18/20 (!) 156/71   10/15/20 118/80   10/08/20 126/84    Pulse Readings from Last 3 Encounters:   20 87   18 79   17 83      Resp Readings from Last 3 Encounters:   10/18/20 20    SpO2 Readings from Last 3 Encounters:   18 97%   17 97%   17 99%      Temp Readings from Last 1 Encounters:   10/18/20 36.8  C (98.2  F) (Temporal)    Ht Readings from Last 1 Encounters:   20 1.778 m (5' 10\")      Wt Readings from Last 1 Encounters:   10/15/20 117 kg (258 lb)    Estimated body mass index is 37.02 kg/m  as calculated from the following:    Height as of 3/12/20: 1.778 m (5' 10\").    Weight as of 10/15/20: 117 kg (258 lb).       Anesthesia Plan      History & Physical Review      ASA Status:  2 .        Plan for Epidural            Postoperative Care      Consents  Anesthetic plan, risks, benefits and alternatives discussed with:  Patient..                 Bel Zaldivar  "

## 2020-10-19 NOTE — ANESTHESIA PROCEDURE NOTES
Procedure note : epidural catheter      Staff -   Anesthesiologist:  Bel Zaldivar  Performed By: anesthesiologist  Pre-Procedure  Performed by Bel Zaldivar  Location: OB      Pre-Anesthestic Checklist: patient identified, IV checked, risks and benefits discussed, informed consent, monitors and equipment checked and pre-op evaluation    Timeout  Correct Patient: Yes   Correct Procedure: Yes   Correct Site: Yes   Correct Laterality: N/A   Correct Position: Yes   Site Marked: N/A   .   Procedure Documentation    .    Procedure: epidural catheter, .   Patient Position:sitting Insertion Site:L3-4  (midline approach) Injection technique: LORT saline   Local skin infiltrated with mL of 1% lidocaine.      Patient Prep/Sterile Barriers; mask, sterile gloves, povidone-iodine 7.5% surgical scrub, patient draped.  .  Needle: Touhy needle   Needle Gauge: 17.    Needle Length (Inches) 3.5   # of attempts: 1 and # of redirects:  .    . .  Catheter threaded easily  .  12 cm at skin.   .    Assessment/Narrative  Paresthesias: No.  .  .  Aspiration negative for heme or CSF  . Test dose of 3 mL lidocaine 1.5% w/ 1:200,000 epinephrine at. Test dose negative for signs of intravascular, subdural or intrathecal injection. Comments:  No complications, patient tolerated the procedure well.  Sterile dressing placed.

## 2020-10-20 LAB — HGB BLD-MCNC: 10.8 G/DL (ref 11.7–15.7)

## 2020-10-20 PROCEDURE — 250N000013 HC RX MED GY IP 250 OP 250 PS 637: Performed by: OBSTETRICS & GYNECOLOGY

## 2020-10-20 PROCEDURE — 120N000012 HC R&B POSTPARTUM

## 2020-10-20 PROCEDURE — 250N000011 HC RX IP 250 OP 636: Performed by: OBSTETRICS & GYNECOLOGY

## 2020-10-20 PROCEDURE — 85018 HEMOGLOBIN: CPT | Performed by: OBSTETRICS & GYNECOLOGY

## 2020-10-20 RX ADMIN — DOCUSATE SODIUM 50 MG AND SENNOSIDES 8.6 MG 1 TABLET: 8.6; 5 TABLET, FILM COATED ORAL at 08:36

## 2020-10-20 RX ADMIN — CEFAZOLIN SODIUM 2 G: 2 INJECTION, SOLUTION INTRAVENOUS at 13:04

## 2020-10-20 RX ADMIN — ACETAMINOPHEN 975 MG: 325 TABLET, FILM COATED ORAL at 06:05

## 2020-10-20 RX ADMIN — KETOROLAC TROMETHAMINE 30 MG: 30 INJECTION, SOLUTION INTRAMUSCULAR at 15:06

## 2020-10-20 RX ADMIN — ACETAMINOPHEN 975 MG: 325 TABLET, FILM COATED ORAL at 18:53

## 2020-10-20 RX ADMIN — SODIUM CHLORIDE, SODIUM LACTATE, POTASSIUM CHLORIDE, CALCIUM CHLORIDE AND DEXTROSE MONOHYDRATE: 5; 600; 310; 30; 20 INJECTION, SOLUTION INTRAVENOUS at 05:44

## 2020-10-20 RX ADMIN — IBUPROFEN 600 MG: 600 TABLET ORAL at 20:59

## 2020-10-20 RX ADMIN — DOCUSATE SODIUM 50 MG AND SENNOSIDES 8.6 MG 1 TABLET: 8.6; 5 TABLET, FILM COATED ORAL at 20:59

## 2020-10-20 RX ADMIN — CEFAZOLIN SODIUM 2 G: 2 INJECTION, SOLUTION INTRAVENOUS at 21:41

## 2020-10-20 RX ADMIN — CEFAZOLIN SODIUM 2 G: 2 INJECTION, SOLUTION INTRAVENOUS at 05:44

## 2020-10-20 RX ADMIN — KETOROLAC TROMETHAMINE 30 MG: 30 INJECTION, SOLUTION INTRAMUSCULAR at 03:00

## 2020-10-20 RX ADMIN — ACETAMINOPHEN 975 MG: 325 TABLET, FILM COATED ORAL at 01:03

## 2020-10-20 RX ADMIN — ACETAMINOPHEN 975 MG: 325 TABLET, FILM COATED ORAL at 13:04

## 2020-10-20 RX ADMIN — KETOROLAC TROMETHAMINE 30 MG: 30 INJECTION, SOLUTION INTRAMUSCULAR at 08:36

## 2020-10-20 NOTE — PROGRESS NOTES
Data: Mireya Whitten transferred to [facility] via bed at 2230. Baby transferred via parent's arms.  Action: Receiving unit notified of transfer: Yes. Patient and family notified of room change. Report given to SHARON Brumfield at 2240. Belongings sent to receiving unit. Accompanied by Registered Nurse. Oriented patient to surroundings. Call light within reach. ID bands double-checked with receiving RN.  Response: Patient tolerated transfer and is stable.

## 2020-10-20 NOTE — PROGRESS NOTES
Notified by RN about low grade temperature of 100.1F which is not a fever. She does not meet criteria for chorioamnionitis at this time however in the setting of prolonged ruptured membranes of 39 hours and increased WBC to 21K will give prolonged prophylaxis with ancef X 3 more doses. Should she have a true fever I will change her antibiotics to triple antibiotics at that time for presumed chorioamnionitis: Ampicillin +Gent + Clindamycin.    Taylor Casas MD

## 2020-10-20 NOTE — ANESTHESIA CARE TRANSFER NOTE
Patient: Mireya Whitten    * No procedures listed *    Diagnosis: * No pre-op diagnosis entered *  Diagnosis Additional Information: No value filed.    Anesthesia Type:   Epidural     Note:  Airway :Room Air  Patient transferred to:PACU  Handoff Report: Identifed the Patient, Identified the Reponsible Provider, Reviewed the pertinent medical history, Discussed the surgical course, Reviewed Intra-OP anesthesia mangement and issues during anesthesia, Set expectations for post-procedure period and Allowed opportunity for questions and acknowledgement of understanding      Vitals: (Last set prior to Anesthesia Care Transfer)    CRNA VITALS  10/19/2020 1957 - 10/19/2020 2037      10/19/2020             Resp Rate (set):  10                Electronically Signed By: KAILYN Young CRNA  October 19, 2020  8:37 PM

## 2020-10-20 NOTE — OP NOTE
Procedure Date: 10/19/2020      PREOPERATIVE DIAGNOSES:   1.  Intrauterine pregnancy at 41+5 weeks gestational age.   2.  Gestational hypertension rule out preeclampsia.   3.  Arrest of descent.   4.  Prolonged ruptured membranes at 39 hours.      POSTOPERATIVE DIAGNOSES:   1.  Intrauterine pregnancy at 41+5 weeks gestational age.   2.  Gestational hypertension rule out preeclampsia.   3.  Arrest of descent.   4.  Prolonged ruptured membranes at 39 hours.   5.  Fetal macrosomia.      PROCEDURE:  Primary low transverse caesarean section via Pfannenstiel skin incision.      SURGEON:  Taylor Casas MD      ASSISTANT SURGEON:  Franchesca Dave, KAILYN, CNM.  Ms. Dave was asked to assist due to the complicated nature of the procedure and the need for an advanced assistant.      ANESTHESIA:  Received epidural.      ESTIMATED BLOOD LOSS:  13 10 mL      URINE OUTPUT:  100 mL      SPECIMENS:  Umbilical cord tissue and cord blood.      FINDINGS:  Male infant in left occiput transverse position.  Nuchal cord x 1, weight 10 pounds 11 ounces, Apgars 8 and 9.  Normal uterus, bilateral fallopian tubes and ovaries.      COMPLICATIONS:  None apparent at time of procedure.      INDICATIONS:  The patient is a 35-year-old,  2, now para 1-0-1-1, who was admitted after ruptured membranes at home at 4:45 a.m. on 10/18.  She was augmented with oxytocin.  She had a protracted active phase of labor.  She eventually progressed to complete and she pushed for a total of 2 hours and 20 minutes, second stage lasted 3 hours and 46 minutes.  She only pushed for 2 hours and 20 minutes.  There was no appreciable fetal descent.  Therefore, MD team was consulted for a primary  section for arrest of descent.  During labor and upon admission, she did have mildly elevated blood pressures with normal labs.      PROCEDURE IN DETAIL:  The patient was taken to the operating room, IV fluids running.  She received Ancef and  azithromycin for infection prophylaxis.  She had her epidural dose for surgical level.  She was then placed in a left lateral tilt.  Fetal heart tones were auscultated and a Bernal catheter was placed to gravity.  She was then prepped and draped in the usual sterile fashion.  After final time-out was obtained, the epidural anesthetic was found to be adequate, therefore, Pfannenstiel skin incision was made in the lower abdomen, carried down to the level of the fascia.  The fascia was incised in the midline and tented upwards.  The rectus muscles were then dissected off of the fascia superiorly and inferiorly.  The peritoneum was entered into with a combination of sharp and blunt dissection.  Clear peritoneal fluid was noted.  The Jose O retractor was then placed.  The fetal position was palpated.  A bladder blade was placed against the lower uterine segment.  A low transverse incision was made in the uterus.  Clear amniotic fluid was noted.  The infant was delivered in cephalic presentation, nuchal cord x 1 was noted.  Delayed cord clamping was performed and infant was then handed off to the waiting  nurse.  The placenta was delivered via external massage of the uterus.  The uterus was cleaned of all clots and debris and reapproximated in 2 layers using 0 Vicryl suture.  There was good hemostasis along the hysterotomy.  Bilateral fallopian tubes and ovaries were then examined and noted to be of normal anatomy.  Any hemoperitoneum was then cleaned out of the abdominal cavity and the peritoneum was reapproximated using 2-0 chromic in a running fashion in the midline.  Next, the fascia was reapproximated using 0 Vicryl suture in a running fashion.  The subcutaneous tissue was irrigated and reapproximated using 2-0 plain gut in interrupted fashion.  The skin was then reapproximated using 3-0 Monocryl in a subcuticular fashion.  At the end of the procedure was ended the procedure, all instrument counts were noted  to be correct x 3.  The patient was taken to recovery room in stable condition.         CAMILLA STEPHENS MD             D: 10/19/2020   T: 10/20/2020   MT: [email or web address]      Name:     JENNIFER RIVER   MRN:      [id number]        Account:        [id number]   :      1985           Procedure Date: 10/19/2020      Document: [id number]

## 2020-10-20 NOTE — ANESTHESIA POSTPROCEDURE EVALUATION
Patient: Mireya Whitten    Procedure(s):   SECTION    Diagnosis:* No pre-op diagnosis entered *  Diagnosis Additional Information: No value filed.    Anesthesia Type:  Epidural    Note:  Anesthesia Post Evaluation    Patient location during evaluation: floor (Postpartum Unit)  Patient participation: Able to fully participate in evaluation  Level of consciousness: awake and alert  Pain management: adequate  Airway patency: patent  Cardiovascular status: acceptable  Respiratory status: acceptable  Hydration status: acceptable  PONV: none     Anesthetic complications: None    Comments: Pt doing well.  Denies epidural-related complaints.          Last vitals:  Vitals:    10/19/20 1739 10/19/20 1831 10/19/20 183   BP:   (!) 142/94   Resp: 20 18    Temp: 37.2  C (99  F) 37.4  C (99.3  F)    SpO2:            Electronically Signed By: Tae Sutton MD  2020  8:49 PM

## 2020-10-20 NOTE — PLAN OF CARE
Vitals stable. Up out of bed at 1030. Ambulating well and free of dizziness. Adequate urine output. Tolerating general diet. Pain controlled. Breastfeeding going well.

## 2020-10-20 NOTE — LACTATION NOTE
"Initial visit with Mireya, JACIEL and infant. Infant ready for feeding when LC into room. LGA. OTs: 62, 51. Infant placed skin to skin with Mireya in football hold on right breast. Infant attempting to latch but unable to maintain latch. Sleepy and not wanting to wake up. Infant appears to have a heart shape tongue consistent with a tongue tie.  taught Mireya how to hand express. Mireya's breast are tender and not handling hand expression well. Decision was made between parents, LC and RN for infant to supplement this feeding. Parents given feeding options and they chose formula via bottle. While LC gone getting supplies infant woke up and LC helped get infant latched. Infant suckled well with multiple swallows. Mireya denies any pain with latch. Towards the end of the feeding infant started clicking tongue and slipped off the breast. Unable to get infant latched back on. Infant bottle fed 10mls Similac without problem. Mireya set up with hospital grade pump at this time. Shown how to use it. Pumped for 15 minutes using initiate mode and expressed 2.5mls. Will feed to infant with next feeding.     Breastfeeding general information reviewed. Advised to breastfeed on demand.    Encouraged rooming in, skin to skin, feeding on demand 8-12x/day or sooner if baby cues.  Explained benefits of holding and skin to skin. Discussed typical feeding pattern for the next 24-48 hours.     Discussed typical onset of mature milk. Instructed on hand expression and when to start pumping and introducing a bottle if needed when returning to work.     Breastfeeding going well per mother. Pt has a pump for use at home. Encouraged to read \"A New Beginning\". Patient thankful for  support and advise.    All questions answered. No further questions at this time. Will follow as needed.     ESTRELLA Lindsay RN, BSN, PHN, IBCLC    "

## 2020-10-20 NOTE — ANESTHESIA POSTPROCEDURE EVALUATION
Patient: Mireya Whitten    * No procedures listed *    Diagnosis:* No pre-op diagnosis entered *  Diagnosis Additional Information: No value filed.    Anesthesia Type:  Epidural    Note:  Anesthesia Post Evaluation    Patient location during evaluation: floor (Postpartum Unit)  Patient participation: Able to fully participate in evaluation  Level of consciousness: awake and alert  Pain management: adequate  Airway patency: patent  Cardiovascular status: acceptable  Respiratory status: acceptable  Hydration status: acceptable  PONV: none     Anesthetic complications: None    Comments: Pt doing well.  Denies epidural-related complaints.          Last vitals:  Vitals:    10/19/20 1739 10/19/20 1831 10/19/20 1832   BP:   (!) 142/94   Resp: 20 18    Temp: 37.2  C (99  F) 37.4  C (99.3  F)    SpO2:            Electronically Signed By: Tae Sutton MD  October 19, 2020  8:49 PM

## 2020-10-20 NOTE — PLAN OF CARE
Pt pulse ranged from  and temperatures ranged from 98.1 F-99.6 F during the night.  All other VSS.  Scant lochia rubra.  Incision w/dried drainage on dressing.  Bernal patent w/adequate output.  Pain managed w/Toradol and Tylenol.  Breastfeeding well, supplementing w/Similac formula using bottle with poor feeds.  Bonding well w/infant.  Working on independence w/infant cares.  Spouse supportive at bedside.  Nursing to continue to monitor.

## 2020-10-20 NOTE — PROGRESS NOTES
"S: Pt doing well. Infant is being  and Bottlefed. Pain is controlled with current analgesics.  Medication(s) being used: acetaminophen and toradol and duramorph in her spinal. Pain controlled when laying in bed but some appropriate discomfort with moving, sitting up. Tolerated some regular diet overnight but hasn't eaten yet this AM. No N/V and + flatus.    O: /53   Pulse 100   Temp 98.7  F (37.1  C) (Oral)   Resp 16   Ht 1.778 m (5' 10\")   Wt 117 kg (257 lb 15.7 oz)   LMP 01/01/2020   SpO2 97%   Breastfeeding Unknown   BMI 37.02 kg/m          ABD:  Uterine fundus is firm, non-tender and at the level of the umbilicus       INC: dressing is clean/dry/intact other than some old dry drainage along bottom of dressing which is partially lifted up                Hemoglobin   Date Value Ref Range Status   10/20/2020 10.8 (L) 11.7 - 15.7 g/dL Final            Lab Results   Component Value Date    RH Pos 10/18/2020       A: Post-op Day #1 s/p C/Section    P: Continue Post Op Cares  Advance cares  Had some tachycardia and low grade temp though all <100.1. given ancef x3 doses s/p c/s due to PPROM of 39 hours though no official chorio/endometritis dx made    "

## 2020-10-20 NOTE — PLAN OF CARE
Pt. admitted from L&D via bed.. Pt. arrived with baby and was accompanied by her  and arrived with personal belongings. Report was taken from SHARON Thompson in L&D.  Pt. is A&Ox3 and VSS on RA. Fundus is firm and midline.  Vaginal bleeding is scant.   Pt. denies pain. Pt. denied nausea, CP, SOB, lightheadedness, or dizziness. PIV patent and infusing.  Bernal patent and draining.  Dressing to lower abdomen CDI.  Pneumoboots in place to BLE.  Pt. oriented to the room and call light system.  Care transferred to night nurse

## 2020-10-20 NOTE — ANESTHESIA PREPROCEDURE EVALUATION
"Anesthesia Pre-Procedure Evaluation    Patient: Mireya Whitten   MRN: [id number] : 1985          Preoperative Diagnosis: * No pre-op diagnosis entered *    * No procedures listed *    History reviewed. No pertinent past medical history.  Past Surgical History:   Procedure Laterality Date     DENTAL SURGERY       NO HISTORY OF SURGERY         Anesthesia Evaluation     .             ROS/MED HX    ENT/Pulmonary:      (-) tobacco use and sleep apnea   Neurologic:       Cardiovascular:         METS/Exercise Tolerance:     Hematologic:         Musculoskeletal:         GI/Hepatic:        (-) GERD   Renal/Genitourinary:         Endo:         Psychiatric:         Infectious Disease:         Malignancy:         Other:                          Physical Exam  Normal systems: cardiovascular, pulmonary and dental    Airway   Mallampati: III  TM distance: >3 FB  Neck ROM: full    Dental     Cardiovascular       Pulmonary             Lab Results   Component Value Date    WBC 13.7 (H) 10/18/2020    HGB 13.2 10/18/2020    HCT 39.4 10/18/2020     10/18/2020    GLC 77 2017    TSH 2.44 2020       Preop Vitals  BP Readings from Last 3 Encounters:   10/19/20 (!) 142/94   10/15/20 118/80   10/08/20 126/84    Pulse Readings from Last 3 Encounters:   20 87   18 79   17 83      Resp Readings from Last 3 Encounters:   10/19/20 18    SpO2 Readings from Last 3 Encounters:   10/19/20 (!) 86%   18 97%   17 97%      Temp Readings from Last 1 Encounters:   10/19/20 37.4  C (99.3  F) (Temporal)    Ht Readings from Last 1 Encounters:   10/18/20 1.778 m (5' 10\")      Wt Readings from Last 1 Encounters:   10/18/20 117 kg (257 lb 15.7 oz)    Estimated body mass index is 37.02 kg/m  as calculated from the following:    Height as of this encounter: 1.778 m (5' 10\").    Weight as of this encounter: 117 kg (257 lb 15.7 oz).       Anesthesia Plan      History & Physical Review  History and " physical reviewed and following examination; no interval change.    ASA Status:  2 .    NPO Status:  > 8 hours    Plan for Epidural   PONV prophylaxis:  Ondansetron (or other 5HT-3)    Epidural already in place        Postoperative Care  Postoperative pain management:  Multi-modal analgesia.      Consents  Anesthetic plan, risks, benefits and alternatives discussed with:  Patient and Spouse..                 Tae Sutton MD

## 2020-10-21 PROCEDURE — 250N000013 HC RX MED GY IP 250 OP 250 PS 637: Performed by: OBSTETRICS & GYNECOLOGY

## 2020-10-21 PROCEDURE — 120N000012 HC R&B POSTPARTUM

## 2020-10-21 RX ADMIN — ACETAMINOPHEN 975 MG: 325 TABLET, FILM COATED ORAL at 19:07

## 2020-10-21 RX ADMIN — ACETAMINOPHEN 975 MG: 325 TABLET, FILM COATED ORAL at 14:24

## 2020-10-21 RX ADMIN — IBUPROFEN 600 MG: 600 TABLET ORAL at 22:14

## 2020-10-21 RX ADMIN — DOCUSATE SODIUM 50 MG AND SENNOSIDES 8.6 MG 1 TABLET: 8.6; 5 TABLET, FILM COATED ORAL at 19:07

## 2020-10-21 RX ADMIN — ACETAMINOPHEN 975 MG: 325 TABLET, FILM COATED ORAL at 06:16

## 2020-10-21 RX ADMIN — IBUPROFEN 600 MG: 600 TABLET ORAL at 03:18

## 2020-10-21 RX ADMIN — DOCUSATE SODIUM 50 MG AND SENNOSIDES 8.6 MG 1 TABLET: 8.6; 5 TABLET, FILM COATED ORAL at 09:54

## 2020-10-21 RX ADMIN — IBUPROFEN 600 MG: 600 TABLET ORAL at 15:45

## 2020-10-21 RX ADMIN — IBUPROFEN 600 MG: 600 TABLET ORAL at 09:54

## 2020-10-21 RX ADMIN — ACETAMINOPHEN 975 MG: 325 TABLET, FILM COATED ORAL at 01:20

## 2020-10-21 NOTE — PLAN OF CARE
Pt VSS.  Fundus firm, midline, U/1.  Scant lochia rubra.  Incision WDL.  Voiding adequately.  Up independently.  Pain managed w/ibuprofen and Tylenol.  Breastfeeding well, supplementing w/Similac formula using bottle with poor feeds, infant cluster feeding at times.  Bonding well w/infant.  Spouse supportive at bedside.  Nursing to continue to monitor.

## 2020-10-21 NOTE — PLAN OF CARE
Patient meeting expected goals for this shift.  Using ibuprofen & tylenol for pain/comfort.  Independent in all self cares.  Bernal catheter out at 2000 and ambulating around the room ad chelsey.  Working on breastfeeding  and  cares.  Plan to start pumping if  has any further poor feeds or feeding attempts.   present at bedside and supportive.  Positive bonding behaviors observed.  Continue to monitor and notify MD as needed.

## 2020-10-21 NOTE — PROVIDER NOTIFICATION
10/20/20 2110   Provider Notification   Provider Name/Title Dr Arceo   Method of Notification Phone   Request Evaluate-Remote   Notification Reason Status Update     Dr. Arceo paged via answering service at 2110 and call returned promptly.  Patient's IV catheter is damaged and IV access lost before final dose of Ancef this evening.  MD would like IV replaced and last dose of antibiotic given.

## 2020-10-21 NOTE — PLAN OF CARE
Vital signs stable. Postpartum assessment WDL. Incision steri strips, WDL. Pain controlled with tylenol and ibuprofen. Patient ambulating independently, voiding adequately, BS +, passing flatus. Breastfeeding on cue with minimal assist. Patient and infant bonding well. Will continue with current plan of care.

## 2020-10-21 NOTE — LACTATION NOTE
Routine visit with Karely verma and infant. Mireya states infant has had some really good feeds and some sleepy feeds. Infant over 24 hours now and LGA. Done with blood sugar checks. Encouraged patient to pump and bottle ebm and formula if infant is sleepy through next feeding and for any subsequent sleepy feeds. Rn updated on plan. Will continue to follow.   ESTRELLA Lindsay RN, BSN, PHN, IBCLC

## 2020-10-22 VITALS
OXYGEN SATURATION: 98 % | WEIGHT: 257.98 LBS | RESPIRATION RATE: 16 BRPM | SYSTOLIC BLOOD PRESSURE: 117 MMHG | BODY MASS INDEX: 36.93 KG/M2 | TEMPERATURE: 98.4 F | DIASTOLIC BLOOD PRESSURE: 80 MMHG | HEART RATE: 89 BPM | HEIGHT: 70 IN

## 2020-10-22 LAB
ALBUMIN SERPL-MCNC: 2.1 G/DL (ref 3.4–5)
ALP SERPL-CCNC: 190 U/L (ref 40–150)
ALT SERPL W P-5'-P-CCNC: 13 U/L (ref 0–50)
ANION GAP SERPL CALCULATED.3IONS-SCNC: 7 MMOL/L (ref 3–14)
AST SERPL W P-5'-P-CCNC: 22 U/L (ref 0–45)
BILIRUB SERPL-MCNC: 0.7 MG/DL (ref 0.2–1.3)
BUN SERPL-MCNC: 9 MG/DL (ref 7–30)
CALCIUM SERPL-MCNC: 8.6 MG/DL (ref 8.5–10.1)
CHLORIDE SERPL-SCNC: 108 MMOL/L (ref 94–109)
CO2 SERPL-SCNC: 23 MMOL/L (ref 20–32)
CREAT SERPL-MCNC: 0.61 MG/DL (ref 0.52–1.04)
ERYTHROCYTE [DISTWIDTH] IN BLOOD BY AUTOMATED COUNT: 14.6 % (ref 10–15)
GFR SERPL CREATININE-BSD FRML MDRD: >90 ML/MIN/{1.73_M2}
GLUCOSE SERPL-MCNC: 72 MG/DL (ref 70–99)
HCT VFR BLD AUTO: 31.4 % (ref 35–47)
HGB BLD-MCNC: 10.3 G/DL (ref 11.7–15.7)
MCH RBC QN AUTO: 29.4 PG (ref 26.5–33)
MCHC RBC AUTO-ENTMCNC: 32.8 G/DL (ref 31.5–36.5)
MCV RBC AUTO: 90 FL (ref 78–100)
PLATELET # BLD AUTO: 225 10E9/L (ref 150–450)
POTASSIUM SERPL-SCNC: 3.7 MMOL/L (ref 3.4–5.3)
PROT SERPL-MCNC: 5.9 G/DL (ref 6.8–8.8)
RBC # BLD AUTO: 3.5 10E12/L (ref 3.8–5.2)
SODIUM SERPL-SCNC: 138 MMOL/L (ref 133–144)
WBC # BLD AUTO: 11.9 10E9/L (ref 4–11)

## 2020-10-22 PROCEDURE — 36415 COLL VENOUS BLD VENIPUNCTURE: CPT | Performed by: OBSTETRICS & GYNECOLOGY

## 2020-10-22 PROCEDURE — 250N000013 HC RX MED GY IP 250 OP 250 PS 637: Performed by: OBSTETRICS & GYNECOLOGY

## 2020-10-22 PROCEDURE — 85027 COMPLETE CBC AUTOMATED: CPT | Performed by: OBSTETRICS & GYNECOLOGY

## 2020-10-22 PROCEDURE — 80053 COMPREHEN METABOLIC PANEL: CPT | Performed by: OBSTETRICS & GYNECOLOGY

## 2020-10-22 RX ORDER — OXYCODONE HYDROCHLORIDE 5 MG/1
5 TABLET ORAL EVERY 4 HOURS PRN
Qty: 18 TABLET | Refills: 0 | Status: SHIPPED | OUTPATIENT
Start: 2020-10-22 | End: 2020-12-01

## 2020-10-22 RX ORDER — IBUPROFEN 600 MG/1
600 TABLET, FILM COATED ORAL EVERY 6 HOURS PRN
Qty: 30 TABLET | Refills: 0 | Status: SHIPPED | OUTPATIENT
Start: 2020-10-22 | End: 2020-12-01

## 2020-10-22 RX ADMIN — ACETAMINOPHEN 975 MG: 325 TABLET, FILM COATED ORAL at 07:53

## 2020-10-22 RX ADMIN — DOCUSATE SODIUM 50 MG AND SENNOSIDES 8.6 MG 1 TABLET: 8.6; 5 TABLET, FILM COATED ORAL at 07:53

## 2020-10-22 RX ADMIN — IBUPROFEN 600 MG: 600 TABLET ORAL at 05:57

## 2020-10-22 RX ADMIN — IBUPROFEN 600 MG: 600 TABLET ORAL at 12:16

## 2020-10-22 RX ADMIN — ACETAMINOPHEN 975 MG: 325 TABLET, FILM COATED ORAL at 01:23

## 2020-10-22 NOTE — PLAN OF CARE
Vital signs stable, fundus firm, scant rubra flow. Patient is up ad chelsey, voiding adequately, pain well controlled Patient is bonding well with infant.

## 2020-10-22 NOTE — LACTATION NOTE
Follow up Lactation visit with Mireya, significant other Vinod & baby boy Freddie. Getting ready for discharge. Mireya reports feeding is going well, but at times baby is sleepy. She report that this morning, baby fed for about 5 minutes but she heard multiple swallows and when he came off the breast, saw drops of milk in his mouth. She was also easily able to express some droplets at that time.  Discussed cluster feeding, what it is and when to expect it, The Second Night, satiety cues, feeding cues, and reviewed Feeding Log for home use.     Mireya reported tender nipples. LC assessed nipples and noted some bruising on left. Encouraged lanolin cream with each feeding. Also noted breasts are starting to fill when assessing nipples. Reviewed signs of mature milk. Due to some poor feedings intermittently, discussed Mireya can pump after any breastfeed where baby does not feed well until baby's follow up peds appointment and breastfeeding is well established.    Reviewed milk supply and engorgement. Reviewed typical timeline of milk supply initiation and progression over first 3-5 days postpartum. Discussed comfort measures for engorgement, plugged duct treatment, and warning signs of breast infection. Discussed using breast pump in preparation for return to work. Discussed milk storage, introducing a bottle, and general recommendation to wait to start pumping for milk storage or bottle feeding in preparation for return to work until breastfeeding is well established in 3-4 weeks. Exceptions: if feeding is poor or baby needs to supplement for medical reasons.    Feeding plan: Recommend unlimited, frequent breast feedings: At least 8 - 12 times every 24 hours. Avoid pacifiers and supplementation with formula unless medically indicated. Encouraged use of feeding log and to record feedings, and void/stool patterns. Mireya has a breast pump for home use. Follow up with [facility], [place] [facility]. Encouraged follow up  through pediatrician's office with Lactation as needed. Reviewed outpatient lactation resources. Mireya & Vinod appreciative of visit.    Buffy Childers RN-C, IBCLC, MNN, PHN, BSN

## 2020-10-22 NOTE — PLAN OF CARE
D: VSS, assessments WDL.   I: Pt. received complete discharge paperwork and home medications as filled by discharge pharmacy to include ibuprofen and oxycodone.   Pt. was given times of last dose for all discharge medications in writing on discharge medication sheets.  Discharge teaching included home medication, pain management, activity restrictions, postpartum cares, and signs and symptoms of infection.    A: Discharge outcomes on care plan met.  Mother states understanding and comfort with self and baby cares.  P: Pt. discharged to home.  Pt. was discharged with baby, and bands were checked at time of discharge.  Pt. was accompanied by , nurse and baby, and left with personal belongings.  Home care ordered.  Pt. to follow up with OB per MD order.  Pt. had no further questions at the time of discharge and no unmet needs were identified.

## 2020-10-22 NOTE — PLAN OF CARE
Patient has stable vitals.  Taking tylenol and ibuprofen every 6 hours for mild incisional discomfort.  Using abdominal binder when up.  Ambulating in halls.  Passing flatus.  Breast feeding going well with baby boy.  Encouraged to call with any questions/concerns.

## 2020-10-22 NOTE — LACTATION NOTE
Follow up visit with Vinod Gomes, and dave Frazier. Baby was ready to eat assisted Mireya in placing baby in football hold on the right side. Baby had deep nutritive latch. Discussed how latch should feel and to check nipple shape with baby comes off. Mireya had questions about when to start pumping and the use of the [place], education provided. Will follow as needed.     Tamica Pena RN  Lactation Educator

## 2020-10-22 NOTE — PROGRESS NOTES
"Mireya Acunacharlie  October 22, 2020    S: pt is doing well.  Tolerating po intake and pain is well controlled.     O:/81   Pulse 102   Temp 98  F (36.7  C) (Oral)   Resp 18   Ht 1.778 m (5' 10\")   Wt 117 kg (257 lb 15.7 oz)   LMP 01/01/2020   SpO2 98%   Breastfeeding Unknown   BMI 37.02 kg/m    Recent Labs   Lab 10/20/20  0745 10/19/20  2116 10/18/20  1708   HGB 10.8* 12.3 13.2     Abdomen: soft, non distended, fundus firm below the umbilicus.  Incision is C/D/I  Ext: non tender, no edema or erythema    A/P: s/p LTCS POD #3  Doing well  Continue care  Discharge planning for today    Dominique Arceo MD  "

## 2020-11-27 NOTE — PROGRESS NOTES
"Midwife Postpartum 6 Week Visit    Mireya Whitten is a 35 year old here for a postpartum checkup.     Delivery date was 10/19/20. She had a  for FTP of a viable boy, named Freddie \"Keon\", weight 10 pounds 11.4 oz., with complications of Failure to Progress and prolonged ROM (39h).     Since delivery, she has been breast feeding and formula feeding. She felt Keon was hungry \"all the time\" and never stopped feeding to sleep so they started formula. Mostly breastfeeding during the day and formula feeding 2-4 oz at night.  She has not had any signs of infection, her lochia stopped after 3 weeks, recently she has had some spotting and is wondering if her cycle has returned. Reports incision feels somewhat numb but initial tenderness/pain has resolved. Feeling occasionally tearful but not concerned that it is more than situational or PPD. She has not had other complications.      She is voiding and having bowel movements without difficulty.      Contraception was discussed and patient desires mini pill / progesterone only pill. She may consider an IUD in the future.  She  has not had intercourse since delivery.   She complains of No  perineal discomfort.     Mood is Stable  Patient screened for postpartum depression.   Depression Rating was:   Last PHQ-9 score on record =   PHQ-9 SCORE 2020   PHQ-9 Total Score 4     Last GAD7 score on record =   ROBERT-7 SCORE 2020   Total Score 3     Alcohol Score = 1    ROS:  12 point review of systems negative other than symptoms noted below or in the HPI.       Current Outpatient Medications:      Cholecalciferol (VITAMIN D) 50 MCG (2000 UT) CAPS, Take by mouth every other day, Disp: , Rfl:      cyanocobalamin (VITAMIN B-12) 1000 MCG tablet, Take 1 tablet (1,000 mcg) by mouth daily, Disp: 30 tablet, Rfl: 1     ferrous sulfate (FEROSUL) 325 (65 Fe) MG tablet, Take 1 tablet (325 mg) by mouth daily (with breakfast), Disp: 90 tablet, Rfl: 0     norethindrone " "(MICRONOR) 0.35 MG tablet, Take 1 tablet (0.35 mg) by mouth daily, Disp: 112 tablet, Rfl: 3     Prenatal Vit-Fe Fumarate-FA (PNV PRENATAL PLUS MULTIVITAMIN) 27-1 MG TABS per tablet, Take 1 tablet by mouth daily, Disp: , Rfl:      vitamin C (ASCORBIC ACID) 250 MG tablet, Take 1 tablet (250 mg) by mouth daily, Disp: 90 tablet, Rfl: 0.   OB History    Para Term  AB Living   2 1 1 0 1 1   SAB TAB Ectopic Multiple Live Births   0 0 0 0 1      # Outcome Date GA Lbr Terry/2nd Weight Sex Delivery Anes PTL Lv   2 Term 10/19/20 41w5d 10:54 / 03:46 4.86 kg (10 lb 11.4 oz) M CS-LTranv EPI N ROHIT      Complications: Failure to Progress in Second Stage      Name: Freddie      Apgar1: 8  Apgar5: 9   1 AB              Last pap:  No results found for: PAP - due today  Hgb in hospital was 10.3    EXAM:  /70   Ht 1.778 m (5' 10\")   Wt 101.6 kg (224 lb)   LMP 2020   Breastfeeding Yes   BMI 32.14 kg/m    BMI: Body mass index is 32.14 kg/m .  Constitutional: healthy, alert and no distress  Neck: symmetrical, thyroid normal size, no masses present, no lymphadenopathy present.   Breast: deferred, patient lactating.  Abdomen: soft, non-tender, diastasis <1 FB's    PELVIC EXAM:  Vulva: No lesions, BUS WNL, no tenderness  Vagina: Moist, pink, discharge normal  well rugated, no lesions  Cervix: smooth, pink, parous, no visible lesions, midposition  Uterus: Involuted to normal size, non-tender, no masses palpated  Ovaries: No masses palpated  Pelvic tone: weak but present  Rectal exam: deferred      ASSESSMENT:   Normal postpartum exam after  and c/s for FTP.    ICD-10-CM    1. Routine postpartum follow-up  Z39.2    2. Cervical cancer screening  Z12.4 Pap imaged thin layer screen with HPV - recommended age 30 - 65     HPV High Risk Types DNA Cervical   3. General counseling and advice on female contraception  Z30.09 norethindrone (MICRONOR) 0.35 MG tablet       PLAN:  Return as needed or at time of next " expected pap, pelvic, or breast exam.  Teaching: exercise and birth control  Family Planning:mini pill / progesterone only pill, handouts for IUD options.  Use back up method for 7 days  Encourage Kegels and abdominal exercise.  Continue a multivitamin/prenatal supplement, especially if breastfeeding.  Pap smear was obtained today.  Postpartum Hgb was not done today.  Discussed future fertility, TOLAC versus RCS, will need to wait 18 months if desires TOLAC but based on size of fetus may be at risk for another  regardless, she has option to try as long as she waits within the time  Return to clinic: In one year or if IUD placement is desired    30 minutes was spent face to face with the patient today discussing her history, diagnosis, and follow-up plan as noted above. Over 50% of the visit was spent in counseling and coordination of care. 10 minutes spent on physical exam    Total Visit Time: 40 minutes.     MELISSA Us    [facility]    Daisy KERR, am serving as a scribe; to document services personally performed by Franchesca AVINA CNM- based on data collection and the provider's statements to me.    Provider Disclosure:  I agree with above History, Review of Systems, Physical exam and Plan. I have reviewed the content of the documentation and have edited it as needed. I have personally performed the services documented here and the documentation accurately represents those services and the decisions I have made.    KAILYN Sarabia, ELIDA

## 2020-12-01 ENCOUNTER — PRENATAL OFFICE VISIT (OUTPATIENT)
Dept: MIDWIFE SERVICES | Facility: CLINIC | Age: 35
End: 2020-12-01
Payer: COMMERCIAL

## 2020-12-01 VITALS
HEIGHT: 70 IN | BODY MASS INDEX: 32.07 KG/M2 | DIASTOLIC BLOOD PRESSURE: 70 MMHG | SYSTOLIC BLOOD PRESSURE: 112 MMHG | WEIGHT: 224 LBS

## 2020-12-01 DIAGNOSIS — Z12.4 CERVICAL CANCER SCREENING: ICD-10-CM

## 2020-12-01 DIAGNOSIS — Z30.09 GENERAL COUNSELING AND ADVICE ON FEMALE CONTRACEPTION: ICD-10-CM

## 2020-12-01 PROBLEM — O42.90 PROLONGED RUPTURE OF MEMBRANES: Status: RESOLVED | Noted: 2020-10-19 | Resolved: 2020-12-01

## 2020-12-01 PROBLEM — O99.213 OBESITY AFFECTING PREGNANCY IN THIRD TRIMESTER: Status: RESOLVED | Noted: 2020-03-12 | Resolved: 2020-12-01

## 2020-12-01 PROCEDURE — 87624 HPV HI-RISK TYP POOLED RSLT: CPT | Performed by: ADVANCED PRACTICE MIDWIFE

## 2020-12-01 PROCEDURE — G0145 SCR C/V CYTO,THINLAYER,RESCR: HCPCS | Performed by: ADVANCED PRACTICE MIDWIFE

## 2020-12-01 PROCEDURE — 99207 PR POST PARTUM EXAM: CPT | Performed by: ADVANCED PRACTICE MIDWIFE

## 2020-12-01 PROCEDURE — G0124 SCREEN C/V THIN LAYER BY MD: HCPCS | Performed by: PATHOLOGY

## 2020-12-01 RX ORDER — ACETAMINOPHEN AND CODEINE PHOSPHATE 120; 12 MG/5ML; MG/5ML
0.35 SOLUTION ORAL DAILY
Qty: 112 TABLET | Refills: 3 | Status: SHIPPED | OUTPATIENT
Start: 2020-12-01 | End: 2021-06-23

## 2020-12-01 ASSESSMENT — ANXIETY QUESTIONNAIRES
2. NOT BEING ABLE TO STOP OR CONTROL WORRYING: SEVERAL DAYS
7. FEELING AFRAID AS IF SOMETHING AWFUL MIGHT HAPPEN: SEVERAL DAYS
3. WORRYING TOO MUCH ABOUT DIFFERENT THINGS: NOT AT ALL
6. BECOMING EASILY ANNOYED OR IRRITABLE: NOT AT ALL
5. BEING SO RESTLESS THAT IT IS HARD TO SIT STILL: NOT AT ALL
IF YOU CHECKED OFF ANY PROBLEMS ON THIS QUESTIONNAIRE, HOW DIFFICULT HAVE THESE PROBLEMS MADE IT FOR YOU TO DO YOUR WORK, TAKE CARE OF THINGS AT HOME, OR GET ALONG WITH OTHER PEOPLE: NOT DIFFICULT AT ALL
GAD7 TOTAL SCORE: 3
1. FEELING NERVOUS, ANXIOUS, OR ON EDGE: SEVERAL DAYS

## 2020-12-01 ASSESSMENT — PATIENT HEALTH QUESTIONNAIRE - PHQ9
5. POOR APPETITE OR OVEREATING: NOT AT ALL
SUM OF ALL RESPONSES TO PHQ QUESTIONS 1-9: 4

## 2020-12-01 ASSESSMENT — MIFFLIN-ST. JEOR: SCORE: 1801.31

## 2020-12-01 NOTE — PATIENT INSTRUCTIONS
Progesterone Only Oral Contraceptive Pills (POPs/Minipill)      Minipills contain only progesterone. Combined pills contain both estrogen and progesterone. Minipills are a great option for women who are breastfeeding or for women who cannot take estrogen. POPs do not increase your risk for blood clots like combined OCP's do. Women who are breastfeeding should call to change contraceptive type when they are done breastfeeding.     How it works:    The minipill effectively prevents pregnancy by actions of the progesterone hormone on various parts of the reproductive system. It makes cervical mucus too thick for sperm to move easily through, it makes the lining of the uterus too thin for a fertilized egg to grow, and it sometimes prevents ovulation all together. The minipill is slightly less effective than combined pills, the pregnancy rate is about 3%.    How to take the minipill:      Minipills come in packs containing several weeks of pills, each pill is marked in the packs so that one pill is taken every day of the week      Unlike combined OCPs there are no placebo pills      Start the first pack of pills on the first day of your menstrual period, if you are postpartum you can start the pills right away      It is extremely important to take the pill AT THE SAME TIME EVERY DAY (at least within the same hour)      As soon as you finish one pack, start another      If you experience illness such as nausea and vomiting or diarrhea use a backup method for 7 days       Missed/forgotten pills:      If you miss one pill take it as soon as you remember, take your next pill the next day at your usual time and used a backup method like a condom for 7 days if it was more than 3 hours late              If you miss two pills do not take the missed pills, just continue taking your minipill as you normally would, but make sure to use a backup method for the duration of that package, until you start a new one      If you miss  more than two pills please contact the clinic      Menstrual changes:      Women taking the minipill often experience short/irregular cycles or may not have a period at all      You may also experience some spotting or bleeding in between your cycles      Contact the clinic if:      You miss one or two pills and then do not get a period      If you have been experiencing normal periods and unexpectedly miss one      If you have any symptoms of pregnancy such as breast tenderness, increased tiredness, or cramping in your lower abdomen      If you have heavy or prolonged bleeding, abdominal pain, fever, or cramps    You can stop taking the minipill if you wish to get pregnant.     Please call with any questions or concerns:    [facility]     [phone]

## 2020-12-02 ASSESSMENT — ANXIETY QUESTIONNAIRES: GAD7 TOTAL SCORE: 3

## 2020-12-04 LAB
COPATH REPORT: ABNORMAL
PAP: ABNORMAL

## 2020-12-08 ENCOUNTER — PATIENT OUTREACH (OUTPATIENT)
Dept: OBGYN | Facility: CLINIC | Age: 35
End: 2020-12-08

## 2020-12-08 DIAGNOSIS — R87.810 ASCUS WITH POSITIVE HIGH RISK HPV CERVICAL: ICD-10-CM

## 2020-12-08 DIAGNOSIS — R87.610 ASCUS WITH POSITIVE HIGH RISK HPV CERVICAL: ICD-10-CM

## 2020-12-08 LAB
FINAL DIAGNOSIS: ABNORMAL
HPV HR 12 DNA CVX QL NAA+PROBE: POSITIVE
HPV16 DNA SPEC QL NAA+PROBE: NEGATIVE
HPV18 DNA SPEC QL NAA+PROBE: NEGATIVE
SPECIMEN DESCRIPTION: ABNORMAL
SPECIMEN SOURCE CVX/VAG CYTO: ABNORMAL

## 2020-12-08 NOTE — TELEPHONE ENCOUNTER
2009 NIL  3/9/10 LSIL  4/2/10 colpo bx OLIVERIO I, ECC neg  2011 NIL  5/1/12 ASCUS, +HR HPV (unknown strain)  10/31/12 NIL  2013, 2015, 2018 NIL paps  12/1/20 ASCUS, +HR HPV (not 16/18). Plan: colposcopy due before 3/1/21

## 2021-03-01 ENCOUNTER — PATIENT OUTREACH (OUTPATIENT)
Dept: OBGYN | Facility: CLINIC | Age: 36
End: 2021-03-01

## 2021-06-24 NOTE — PROGRESS NOTES
INDICATIONS:                                                    Is a pregnancy test required: Yes.  Was it positive or negative?  Negative  Was a consent obtained?  Yes    Today's PHQ-2 Score:   PHQ-2 ( 1999 [employer]) 12/1/2020   Q1: Little interest or pleasure in doing things 0   Q2: Feeling down, depressed or hopeless 1   PHQ-2 Score 1     Mireya Whitten, is a 35 year old female, who had a recent ASCUS pap.  HPV Other positive Yes prior history of abnormal pap. Here today for colposcopy. Discussed indication, risks of infection and bleeding.    Her last pap was   Lab Results   Component Value Date    PAP ASC-US, HPV+ 12/01/2020    .    PROCEDURE:                                                      Cervix is stained with acetic acid and viewed colposcopically. Squamocolumnar junction is visualized in it's entirety. no visible lesions . Biopsy done No. Endocervical curretage Done      POST PROCEDURE:                                                      IMPRESSION: Patient tolerated procedure well, colposcopy adequate and HPV    PLAN : Await the results of the biopsies.  She tolerated the procedure well. There were no complications. Patient was discharged in stable condition.    Patient advised to call the clinic if excessive bleeding, pelvic pain, or fever.     Follow-up based on pathology results.  Selina Foss MD

## 2021-07-02 ENCOUNTER — OFFICE VISIT (OUTPATIENT)
Dept: OBGYN | Facility: CLINIC | Age: 36
End: 2021-07-02
Payer: COMMERCIAL

## 2021-07-02 VITALS
DIASTOLIC BLOOD PRESSURE: 66 MMHG | HEIGHT: 70 IN | SYSTOLIC BLOOD PRESSURE: 106 MMHG | HEART RATE: 74 BPM | BODY MASS INDEX: 34.84 KG/M2 | WEIGHT: 243.4 LBS

## 2021-07-02 DIAGNOSIS — R87.810 ASCUS WITH POSITIVE HIGH RISK HPV CERVICAL: ICD-10-CM

## 2021-07-02 DIAGNOSIS — Z01.812 PRE-PROCEDURE LAB EXAM: Primary | ICD-10-CM

## 2021-07-02 DIAGNOSIS — R87.610 ASCUS WITH POSITIVE HIGH RISK HPV CERVICAL: ICD-10-CM

## 2021-07-02 LAB — HCG UR QL: NEGATIVE

## 2021-07-02 PROCEDURE — 81025 URINE PREGNANCY TEST: CPT | Performed by: OBSTETRICS & GYNECOLOGY

## 2021-07-02 PROCEDURE — 57456 ENDOCERV CURETTAGE W/SCOPE: CPT | Performed by: OBSTETRICS & GYNECOLOGY

## 2021-07-02 PROCEDURE — 88305 TISSUE EXAM BY PATHOLOGIST: CPT | Performed by: PATHOLOGY

## 2021-07-02 ASSESSMENT — MIFFLIN-ST. JEOR: SCORE: 1889.31

## 2021-07-06 LAB — COPATH REPORT: NORMAL

## 2021-07-20 ENCOUNTER — PATIENT OUTREACH (OUTPATIENT)
Dept: OBGYN | Facility: CLINIC | Age: 36
End: 2021-07-20

## 2021-10-24 ENCOUNTER — HEALTH MAINTENANCE LETTER (OUTPATIENT)
Age: 36
End: 2021-10-24

## 2022-02-13 ENCOUNTER — HEALTH MAINTENANCE LETTER (OUTPATIENT)
Age: 37
End: 2022-02-13

## 2022-02-17 PROBLEM — O09.93 SUPERVISION OF HIGH RISK PREGNANCY IN THIRD TRIMESTER: Status: RESOLVED | Noted: 2020-03-12 | Resolved: 2020-12-01

## 2022-06-17 ENCOUNTER — PATIENT OUTREACH (OUTPATIENT)
Dept: OBGYN | Facility: CLINIC | Age: 37
End: 2022-06-17
Payer: COMMERCIAL

## 2022-06-17 DIAGNOSIS — R87.610 ASCUS WITH POSITIVE HIGH RISK HPV CERVICAL: ICD-10-CM

## 2022-06-17 DIAGNOSIS — R87.810 ASCUS WITH POSITIVE HIGH RISK HPV CERVICAL: ICD-10-CM

## 2022-07-13 NOTE — TELEPHONE ENCOUNTER
Patient due for Pap and HPV.    Reminder done today via telephone call -  Pt notified. She states she has transferred care outside [place]. End tracking

## 2022-10-16 ENCOUNTER — HEALTH MAINTENANCE LETTER (OUTPATIENT)
Age: 37
End: 2022-10-16

## 2023-03-26 ENCOUNTER — HEALTH MAINTENANCE LETTER (OUTPATIENT)
Age: 38
End: 2023-03-26

## 2024-06-01 ENCOUNTER — HEALTH MAINTENANCE LETTER (OUTPATIENT)
Age: 39
End: 2024-06-01

## 2025-06-14 ENCOUNTER — HEALTH MAINTENANCE LETTER (OUTPATIENT)
Age: 40
End: 2025-06-14

## (undated) DEVICE — BLADE CLIPPER 4406

## (undated) DEVICE — LIGHT HANDLE X2

## (undated) DEVICE — SOL WATER IRRIG 1000ML BOTTLE 07139-09

## (undated) DEVICE — LINEN TOWEL PACK X5 5464

## (undated) DEVICE — ESU GROUND PAD UNIVERSAL W/O CORD

## (undated) DEVICE — SOL NACL 0.9% IRRIG 1000ML BOTTLE 07138-09

## (undated) DEVICE — PREP CHLORAPREP 26ML TINTED ORANGE  260815

## (undated) DEVICE — STPL SKIN PROXIMATE 35 WIDE PMW35

## (undated) DEVICE — DRAPE SHEET REV FOLD 3/4 9349

## (undated) DEVICE — LINEN BABY BLANKET 5434

## (undated) DEVICE — PACK C-SECTION LF PL15OTA83B

## (undated) DEVICE — PAD CHUX UNDERPAD 23X24" 7136

## (undated) DEVICE — SUCTION CANISTER MEDIVAC LINER 3000ML W/LID 65651-530

## (undated) DEVICE — DRSG KERLIX FLUFFS X5

## (undated) DEVICE — PACK SET-UP STD 9102

## (undated) RX ORDER — OXYTOCIN/0.9 % SODIUM CHLORIDE 30/500 ML
PLASTIC BAG, INJECTION (ML) INTRAVENOUS
Status: DISPENSED
Start: 2020-10-19

## (undated) RX ORDER — LIDOCAINE HCL/EPINEPHRINE/PF 2%-1:200K
VIAL (ML) INJECTION
Status: DISPENSED
Start: 2020-10-19

## (undated) RX ORDER — ONDANSETRON 2 MG/ML
INJECTION INTRAMUSCULAR; INTRAVENOUS
Status: DISPENSED
Start: 2020-10-19

## (undated) RX ORDER — MORPHINE SULFATE 1 MG/ML
INJECTION, SOLUTION EPIDURAL; INTRATHECAL; INTRAVENOUS
Status: DISPENSED
Start: 2020-10-19